# Patient Record
Sex: FEMALE | Race: WHITE | NOT HISPANIC OR LATINO
[De-identification: names, ages, dates, MRNs, and addresses within clinical notes are randomized per-mention and may not be internally consistent; named-entity substitution may affect disease eponyms.]

---

## 2017-06-23 ENCOUNTER — HOSPITAL (OUTPATIENT)
Dept: OTHER | Age: 27
End: 2017-06-23
Attending: NURSE PRACTITIONER

## 2018-02-04 ENCOUNTER — HOSPITAL (OUTPATIENT)
Dept: OTHER | Age: 28
End: 2018-02-04
Attending: PHYSICIAN ASSISTANT

## 2018-02-04 LAB
CHLAMYDIA PROBE: NEGATIVE
CHLAMYDIA/GC SOURCE: NORMAL
CLUE CELLS: NORMAL
CONTROL LINE: PRESENT
GC PROBE: NEGATIVE
Lab: CLEAR
N GON SOURCE: NORMAL
TRICHOMONAS: NORMAL
UA APPEAR: CLEAR
UA BACTERIA: ABNORMAL
UA BILI: NEGATIVE
UA BLOOD: ABNORMAL
UA COLOR: YELLOW
UA EPITHELIAL: ABNORMAL
UA GLUCOSE: NEGATIVE
UA KETONES: NEGATIVE
UA LEUK EST: ABNORMAL
UA NITRITE: NEGATIVE
UA PH: 6.5 (ref 5–7)
UA PROTEIN: ABNORMAL
UA RBC: ABNORMAL
UA SPEC GRAV: 1.02 (ref 1.01–1.02)
UA UROBILINOGEN: 0.2 MG/DL (ref 0.2–1)
UA WBC: ABNORMAL
UA YEAST: ABNORMAL
URINE PREG POC: NEGATIVE
WBC, WET PREP: NORMAL
YEAST, WET PREP: NORMAL

## 2018-02-07 ENCOUNTER — HOSPITAL (OUTPATIENT)
Dept: OTHER | Age: 28
End: 2018-02-07
Attending: NURSE PRACTITIONER

## 2018-02-07 LAB
A/G RATIO_: 1
ABS LYMPH: 1.2 K/CUMM (ref 1–3.5)
ABS MONO: 0.8 K/CUMM (ref 0.1–0.8)
ABS NEUTRO: 4.4 K/CUMM (ref 2–8)
ALBUMIN: 3.6 G/DL (ref 3.5–5)
ALK PHOS: 78 UNIT/L (ref 50–124)
ALT/GPT: 17 UNIT/L (ref 0–55)
ANION GAP SERPL CALC-SCNC: 9 MEQ/L (ref 10–20)
AST/GOT: 17 UNIT/L (ref 5–34)
BASOPHIL: 0 % (ref 0–1)
BILI TOTAL: 0.3 MG/DL (ref 0.2–1)
BUN SERPL-MCNC: 7 MG/DL (ref 6–20)
CALCIUM: 9.6 MG/DL (ref 8.4–10.2)
CHLORIDE: 105 MEQ/L (ref 97–107)
CONTROL LINE: PRESENT
CREATININE: 0.65 MG/DL (ref 0.6–1.3)
DIFF_TYPE?: ABNORMAL
EOSINOPHIL: 1 % (ref 0–6)
GLOBULIN_: 3.7 G/DL (ref 2–4.1)
GLUCOSE LVL: 88 MG/DL (ref 70–99)
HCT VFR BLD CALC: 40 % (ref 33–45)
HEMOLYSIS 2+: NEGATIVE
HGB BLD-MCNC: 13.5 G/DL (ref 11–15)
IMMATURE GRAN: 0.5 % (ref 0–0.3)
INSTR WBC: 6.5 K/CUMM (ref 4–11)
LIPEMIC 3+: NEGATIVE
LYMPHOCYTE: 19 %
Lab: CLEAR
MCH RBC QN AUTO: 33 PG (ref 25–35)
MCHC RBC AUTO-ENTMCNC: 33 G/DL (ref 32–37)
MCV RBC AUTO: 99 FL (ref 78–97)
MONOCYTE: 12 %
NEUTROPHIL: 68 %
NRBC BLD MANUAL-RTO: 0 % (ref 0–0.2)
PLATELET: 283 K/CUMM (ref 150–450)
POTASSIUM: 4 MEQ/L (ref 3.5–5.1)
RBC # BLD: 4.08 M/CUMM (ref 3.7–5.2)
RDW: 12 % (ref 11.5–14.5)
SODIUM: 138 MEQ/L (ref 136–145)
TCO2: 28 MEQ/L (ref 19–29)
TOTAL PROTEIN: 7.3 G/DL (ref 6.4–8.3)
UA APPEAR: CLEAR
UA BILI: NEGATIVE
UA BLOOD: NEGATIVE
UA COLOR: YELLOW
UA GLUCOSE: NEGATIVE
UA KETONES: NEGATIVE
UA LEUK EST: NEGATIVE
UA NITRITE: NEGATIVE
UA PH: 7.5 (ref 5–7)
UA PROTEIN: NEGATIVE
UA SPEC GRAV: 1.01 (ref 1.01–1.02)
UA UROBILINOGEN: 0.2 MG/DL (ref 0.2–1)
URINE PREG POC: NEGATIVE
WBC # BLD: 6.5 K/CUMM (ref 4–11)

## 2018-10-20 ENCOUNTER — HOSPITAL (OUTPATIENT)
Dept: OTHER | Age: 28
End: 2018-10-20
Attending: PHYSICIAN ASSISTANT

## 2018-10-20 LAB
INTERNAL QC: NORMAL
RAPID STREP A: NEGATIVE

## 2019-03-24 ENCOUNTER — OCCUPATIONAL HEALTH (OUTPATIENT)
Dept: URGENT CARE | Facility: CLINIC | Age: 29
End: 2019-03-24
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
## 2019-03-24 LAB
CTP QC/QA: YES
POC 5 PANEL DRUG SCREEN: NEGATIVE

## 2019-03-24 PROCEDURE — 80305 DRUG TEST PRSMV DIR OPT OBS: CPT | Mod: QW,S$GLB,, | Performed by: FAMILY MEDICINE

## 2019-03-24 PROCEDURE — 80305 POCT RAPID DRUG SCREEN 5 PANEL: ICD-10-PCS | Mod: QW,S$GLB,, | Performed by: FAMILY MEDICINE

## 2019-07-19 ENCOUNTER — PATIENT OUTREACH (OUTPATIENT)
Dept: ADMINISTRATIVE | Facility: HOSPITAL | Age: 29
End: 2019-07-19

## 2019-07-19 NOTE — PROGRESS NOTES
Health Maintenance Due   Topic Date Due    Lipid Panel  1992    Pneumococcal Vaccine (Medium Risk) (1 of 1 - PPSV23) 04/06/2011    Pap Smear  04/06/2013     Chart review completed 07/19/2019  Pre-visit outreach via mail

## 2019-07-19 NOTE — LETTER
July 19, 2019    Carolina Mena  [address] [facility] [address] [facility] [address]             [facility]  [address]  [facility] [address]  Phone: [phone] Dear Ms. Mena:    [facility] is committed to your overall health.  To help you get the most out of each of your visits, we will review your information to make sure you are up to date on all of your recommended tests and/or procedures.      Dr. Bustamante    has found that your chart shows you may be due for the following:    Fasting cholesterol labs (Lipid Panel)  Pneumonia Immunization  Pap smear    If you have had any of the above done at another facility, please bring the records with you or Fax them to [phone] so that your record at [facility] will be complete. If you have not had any of these tests or procedures done recently and would like to complete this testing ,  please call [phone] or send a message through your [facility] portal to your provider's office.     If you have an upcoming scheduled appointment for the above test and/or procedures, please disregard this letter.    If you are currently taking medication, please bring it with you to your appointment for review.      Sincerely,    Sybil Rivers, Care Coordinator  [facility]  Phone: [phone]  Fax: [phone]

## 2019-07-25 ENCOUNTER — OFFICE VISIT (OUTPATIENT)
Dept: FAMILY MEDICINE | Facility: CLINIC | Age: 29
End: 2019-07-25
Payer: COMMERCIAL

## 2019-07-25 VITALS
BODY MASS INDEX: 37.37 KG/M2 | OXYGEN SATURATION: 99 % | HEART RATE: 101 BPM | HEIGHT: 62 IN | WEIGHT: 203.06 LBS | DIASTOLIC BLOOD PRESSURE: 80 MMHG | SYSTOLIC BLOOD PRESSURE: 110 MMHG

## 2019-07-25 DIAGNOSIS — F43.22 ADJUSTMENT REACTION WITH ANXIETY: ICD-10-CM

## 2019-07-25 DIAGNOSIS — F41.0 PANIC ATTACKS: Primary | ICD-10-CM

## 2019-07-25 PROCEDURE — 3008F BODY MASS INDEX DOCD: CPT | Mod: CPTII,S$GLB,, | Performed by: INTERNAL MEDICINE

## 2019-07-25 PROCEDURE — 99999 PR PBB SHADOW E&M-EST. PATIENT-LVL III: CPT | Mod: PBBFAC,,, | Performed by: INTERNAL MEDICINE

## 2019-07-25 PROCEDURE — 99999 PR PBB SHADOW E&M-EST. PATIENT-LVL III: ICD-10-PCS | Mod: PBBFAC,,, | Performed by: INTERNAL MEDICINE

## 2019-07-25 PROCEDURE — 3008F PR BODY MASS INDEX (BMI) DOCUMENTED: ICD-10-PCS | Mod: CPTII,S$GLB,, | Performed by: INTERNAL MEDICINE

## 2019-07-25 PROCEDURE — 99203 PR OFFICE/OUTPT VISIT, NEW, LEVL III, 30-44 MIN: ICD-10-PCS | Mod: S$GLB,,, | Performed by: INTERNAL MEDICINE

## 2019-07-25 PROCEDURE — 99203 OFFICE O/P NEW LOW 30 MIN: CPT | Mod: S$GLB,,, | Performed by: INTERNAL MEDICINE

## 2019-07-25 RX ORDER — BUSPIRONE HYDROCHLORIDE 5 MG/1
5 TABLET ORAL
Qty: 60 TABLET | Refills: 0 | Status: SHIPPED | OUTPATIENT
Start: 2019-07-25 | End: 2019-09-30 | Stop reason: SDUPTHER

## 2019-07-25 NOTE — PROGRESS NOTES
"  Subjective     Carolina Mena is a 29 y.o. old, female here for Establish Care and Panic Attack    Patient is here to establish care and discuss panic attacks.  She has struggled with anxiety since at least age 19. She has never had any significant issues with depression. She has never been on medications. She was having on average one panic attack a month which has worsened significantly in the past few months. These seemed to worsen after two of her mother's friends passed away. She is having on average one panic attack a day that feels much more uncontrollable. They generally occur at work when she feels overwhelmed. They are associated with chest pain, sense of dread, anxiety about her health, SOB and other symptoms. She has tried distracting herself from the situation, walking, and using a stress ball. She tried an OTC medication but did not like the way it made her feel. She is having difficulty falling asleep at night due to her "mind racing". She has an appointment to see a therapist today.  She is in a supportive relationship, feels safe, got engaged in the past several months.    Review of Systems   Constitutional: Negative for chills and fever.   HENT: Negative for sinus pain.         Recent ear surgery   Eyes: Negative for blurred vision and pain.   Respiratory: Negative for cough and shortness of breath.    Cardiovascular: Negative for chest pain and palpitations.   Gastrointestinal: Negative for abdominal pain and nausea.   Genitourinary: Negative for dysuria and frequency.   Musculoskeletal: Negative for myalgias.        Residual L ankle pain after sprained ankle several months ago   Skin: Negative for itching and rash.   Neurological: Negative for weakness and headaches.   Endo/Heme/Allergies: Negative for environmental allergies. Does not bruise/bleed easily.   Psychiatric/Behavioral: Negative for depression. The patient does not have insomnia.      No past medical history on file.  Past Surgical " History:   Procedure Laterality Date    EXTERNAL EAR SURGERY       Review of patient's allergies indicates:   Allergen Reactions    Augmentin [amoxicillin-pot clavulanate] Hives    Ceclor [cefaclor] Hives    Polysporin [bacitracin-polymyxin b] Other (See Comments)     Burning sensation     Outpatient Medications Marked as Taking for the 7/25/19 encounter (Office Visit) with Gallito Bustamante MD   Medication Sig Dispense Refill    norethindrone-ethinyl estradiol (JUNEL FE 1/20) 1 mg-20 mcg (21)/75 mg (7) per tablet Take 1 tablet by mouth once daily.       Social History     Socioeconomic History    Marital status: Single     Spouse name: Not on file    Number of children: Not on file    Years of education: Not on file    Highest education level: Not on file   Occupational History    Not on file   Social Needs    Financial resource strain: Not hard at all    Food insecurity:     Worry: Never true     Inability: Never true    Transportation needs:     Medical: No     Non-medical: No   Tobacco Use    Smoking status: Current Every Day Smoker     Types: Vaping with nicotine    Smokeless tobacco: Never Used   Substance and Sexual Activity    Alcohol use: No     Frequency: Monthly or less     Drinks per session: 1 or 2     Binge frequency: Never    Drug use: No    Sexual activity: Yes   Lifestyle    Physical activity:     Days per week: 0 days     Minutes per session: 0 min    Stress: Very much   Relationships    Social connections:     Talks on phone: Three times a week     Gets together: Once a week     Attends Worship service: Not on file     Active member of club or organization: No     Attends meetings of clubs or organizations: Never     Relationship status: Living with partner   Other Topics Concern    Not on file   Social History Narrative    Not on file     Family History   Problem Relation Age of Onset    Drug abuse Mother     Hypertension Paternal Grandmother     Heart disease  "Paternal Grandfather     Diabetes Paternal Grandfather        Objective     /80 (BP Location: Left arm, Patient Position: Sitting, BP Method: Large (Manual))   Pulse 101   Ht 5' 2" (1.575 m)   Wt 92.1 kg (203 lb 0.7 oz)   SpO2 99%   BMI 37.14 kg/m²   Physical Exam   Constitutional: She is oriented to person, place, and time. She appears well-developed. No distress.   HENT:   Head: Normocephalic and atraumatic.   Mouth/Throat: Oropharynx is clear and moist and mucous membranes are normal.   Eyes: Pupils are equal, round, and reactive to light. Conjunctivae are normal.   Neck: Neck supple. No thyroid mass and no thyromegaly present.   Cardiovascular: Normal rate, regular rhythm and normal heart sounds.   No murmur heard.  Pulmonary/Chest: Breath sounds normal. No respiratory distress.   Abdominal: Soft. Normal appearance and bowel sounds are normal. There is no tenderness.   Musculoskeletal: She exhibits no edema or deformity.   Lymphadenopathy:     She has no cervical adenopathy.        Right: No supraclavicular adenopathy present.        Left: No supraclavicular adenopathy present.   Neurological: She is alert and oriented to person, place, and time.   Skin: Skin is warm and dry. No rash noted.   Psychiatric: Her behavior is normal. Her mood appears anxious.     Assessment and Plan     1. Panic attacks  Start 1-2 buspar prn for panic attacks. Start talking with a therapist. Exercise regularly.  - busPIRone (BUSPAR) 5 MG Tab; Take 1 tablet (5 mg total) by mouth as needed.  Dispense: 60 tablet; Refill: 0    2. Adjustment reaction with anxiety  Consider first line treatment with SSRI/SNRI, discussed possible medication options in the future.  Consider trazodone for a short time to help with insomnia.  Encouraged to call back or communicate via the portal.        ___________________  Gallito Bustamante MD  Internal Medicine and Pediatrics  "

## 2019-07-31 ENCOUNTER — HOSPITAL (OUTPATIENT)
Dept: OTHER | Age: 29
End: 2019-07-31
Attending: PHYSICIAN ASSISTANT

## 2019-09-27 ENCOUNTER — PATIENT MESSAGE (OUTPATIENT)
Dept: FAMILY MEDICINE | Facility: CLINIC | Age: 29
End: 2019-09-27

## 2019-09-30 DIAGNOSIS — F41.0 PANIC ATTACKS: ICD-10-CM

## 2019-09-30 RX ORDER — BUSPIRONE HYDROCHLORIDE 5 MG/1
5 TABLET ORAL
Qty: 60 TABLET | Refills: 0 | Status: SHIPPED | OUTPATIENT
Start: 2019-09-30 | End: 2019-11-24 | Stop reason: SDUPTHER

## 2019-11-19 ENCOUNTER — HOSPITAL (OUTPATIENT)
Dept: OTHER | Age: 29
End: 2019-11-19
Attending: NURSE PRACTITIONER

## 2019-11-21 ENCOUNTER — OFFICE VISIT (OUTPATIENT)
Dept: FAMILY MEDICINE | Facility: CLINIC | Age: 29
End: 2019-11-21
Payer: COMMERCIAL

## 2019-11-21 VITALS
DIASTOLIC BLOOD PRESSURE: 72 MMHG | WEIGHT: 214.5 LBS | HEIGHT: 62 IN | HEART RATE: 116 BPM | SYSTOLIC BLOOD PRESSURE: 116 MMHG | OXYGEN SATURATION: 98 % | BODY MASS INDEX: 39.47 KG/M2

## 2019-11-21 DIAGNOSIS — F41.9 ANXIETY DISORDER, UNSPECIFIED TYPE: Primary | ICD-10-CM

## 2019-11-21 PROCEDURE — 99999 PR PBB SHADOW E&M-EST. PATIENT-LVL III: ICD-10-PCS | Mod: PBBFAC,,, | Performed by: INTERNAL MEDICINE

## 2019-11-21 PROCEDURE — 99214 PR OFFICE/OUTPT VISIT, EST, LEVL IV, 30-39 MIN: ICD-10-PCS | Mod: S$GLB,,, | Performed by: INTERNAL MEDICINE

## 2019-11-21 PROCEDURE — 99214 OFFICE O/P EST MOD 30 MIN: CPT | Mod: S$GLB,,, | Performed by: INTERNAL MEDICINE

## 2019-11-21 PROCEDURE — 3008F PR BODY MASS INDEX (BMI) DOCUMENTED: ICD-10-PCS | Mod: CPTII,S$GLB,, | Performed by: INTERNAL MEDICINE

## 2019-11-21 PROCEDURE — 3008F BODY MASS INDEX DOCD: CPT | Mod: CPTII,S$GLB,, | Performed by: INTERNAL MEDICINE

## 2019-11-21 PROCEDURE — 99999 PR PBB SHADOW E&M-EST. PATIENT-LVL III: CPT | Mod: PBBFAC,,, | Performed by: INTERNAL MEDICINE

## 2019-11-21 RX ORDER — BUPROPION HYDROCHLORIDE 150 MG/1
150 TABLET ORAL DAILY
Qty: 30 TABLET | Refills: 1 | Status: SHIPPED | OUTPATIENT
Start: 2019-11-21 | End: 2019-12-11

## 2019-11-21 NOTE — PROGRESS NOTES
"  Subjective     Carolina Mena is a 29 y.o. old, female here for discuss medication    Patient is here for follow-up on anxiety. She started taking buspar after our first visit. She does sometimes find it very helpful. She won't necessarily take it everyday. She is open to the idea now of starting a daily medication. Her mother has been on prozac. She has never been on a daily SSRI/SNRI. She feels some depression or aggravation at herself for not being able to handle her anxiety and panic attacks. Current stressors include her job, preparing for her wedding, and her palpitations. She was able to quit vaping. She also cut out caffeine.    Review of Systems   HENT: Negative for hearing loss.    Eyes: Negative for discharge.   Respiratory: Negative for wheezing.    Cardiovascular: Positive for palpitations. Negative for chest pain.   Gastrointestinal: Negative for blood in stool, constipation, diarrhea and vomiting.   Genitourinary: Negative for dysuria and hematuria.   Musculoskeletal: Negative for neck pain.   Neurological: Negative for weakness and headaches.   Endo/Heme/Allergies: Negative for polydipsia.   Answers for HPI/ROS submitted by the patient on 11/21/2019   activity change: No  unexpected weight change: No  rhinorrhea: No  trouble swallowing: No  visual disturbance: No  chest tightness: No  polyuria: No  difficulty urinating: No  menstrual problem: No  joint swelling: No  arthralgias: No  confusion: No  dysphoric mood: Yes    Medications     Outpatient Medications Marked as Taking for the 11/21/19 encounter (Office Visit) with Gallito Bustamante MD   Medication Sig Dispense Refill    busPIRone (BUSPAR) 5 MG Tab Take 1 tablet (5 mg total) by mouth as needed. 60 tablet 0    norethindrone-ethinyl estradiol (JUNEL FE 1/20) 1 mg-20 mcg (21)/75 mg (7) per tablet Take 1 tablet by mouth once daily.       Objective     /72   Pulse (!) 116   Ht 5' 2" (1.575 m)   Wt 97.3 kg (214 lb 8.1 oz)   SpO2 98%  "  BMI 39.23 kg/m²   Physical Exam   Constitutional: She appears well-developed. No distress.   Psychiatric: Her speech is normal and behavior is normal. Thought content normal. Her mood appears anxious. Cognition and memory are normal. She does not exhibit a depressed mood.     Assessment and Plan     1. Anxiety disorder, unspecified type  Continue counseling.  Encourage regular physical activity, social engagement, management of stress.  Reassurance regarding palpitations.  Excellent job stopping vaping.  Start low dose wellbutrin. Consider zoloft or prozac if not helpful.  - buPROPion (WELLBUTRIN XL) 150 MG TB24 tablet; Take 1 tablet (150 mg total) by mouth once daily.  Dispense: 30 tablet; Refill: 1    25 minutes spent with patient, greater than half was spent on counseling or coordination of care.    ___________________  Gallito Bustamante MD  Internal Medicine and Pediatrics

## 2019-11-22 LAB — PAP SMEAR: NORMAL

## 2019-11-24 DIAGNOSIS — F41.0 PANIC ATTACKS: ICD-10-CM

## 2019-11-24 RX ORDER — BUSPIRONE HYDROCHLORIDE 5 MG/1
TABLET ORAL
Qty: 30 TABLET | Refills: 1 | Status: SHIPPED | OUTPATIENT
Start: 2019-11-24 | End: 2019-12-18 | Stop reason: SDUPTHER

## 2019-11-25 ENCOUNTER — PATIENT MESSAGE (OUTPATIENT)
Dept: FAMILY MEDICINE | Facility: CLINIC | Age: 29
End: 2019-11-25

## 2019-12-04 ENCOUNTER — PATIENT MESSAGE (OUTPATIENT)
Dept: FAMILY MEDICINE | Facility: CLINIC | Age: 29
End: 2019-12-04

## 2019-12-11 ENCOUNTER — PATIENT MESSAGE (OUTPATIENT)
Dept: FAMILY MEDICINE | Facility: CLINIC | Age: 29
End: 2019-12-11

## 2019-12-18 DIAGNOSIS — F41.0 PANIC ATTACKS: ICD-10-CM

## 2019-12-18 DIAGNOSIS — F41.9 ANXIETY DISORDER, UNSPECIFIED TYPE: ICD-10-CM

## 2019-12-18 RX ORDER — BUSPIRONE HYDROCHLORIDE 5 MG/1
TABLET ORAL
Qty: 30 TABLET | Refills: 1 | Status: SHIPPED | OUTPATIENT
Start: 2019-12-18 | End: 2020-02-17

## 2019-12-18 RX ORDER — BUPROPION HYDROCHLORIDE 150 MG/1
TABLET ORAL
Qty: 30 TABLET | Refills: 1 | Status: SHIPPED | OUTPATIENT
Start: 2019-12-18 | End: 2020-01-13 | Stop reason: SDUPTHER

## 2020-01-10 LAB
CHOLEST SERPL-MSCNC: 222 MG/DL (ref 0–200)
HDLC SERPL-MCNC: 43 MG/DL
LDLC SERPL CALC-MCNC: 142 MG/DL (ref 0–160)
TRIGL SERPL-MCNC: 186 MG/DL (ref 40–160)

## 2020-02-07 DIAGNOSIS — F41.9 ANXIETY DISORDER, UNSPECIFIED TYPE: ICD-10-CM

## 2020-02-07 RX ORDER — BUPROPION HYDROCHLORIDE 150 MG/1
150 TABLET ORAL DAILY
Qty: 90 TABLET | Refills: 0 | Status: SHIPPED | OUTPATIENT
Start: 2020-02-07 | End: 2021-07-16

## 2020-02-17 DIAGNOSIS — F41.0 PANIC ATTACKS: ICD-10-CM

## 2020-02-17 RX ORDER — BUSPIRONE HYDROCHLORIDE 5 MG/1
TABLET ORAL
Qty: 30 TABLET | Refills: 1 | Status: SHIPPED | OUTPATIENT
Start: 2020-02-17 | End: 2020-03-06 | Stop reason: SDUPTHER

## 2020-03-06 DIAGNOSIS — F41.0 PANIC ATTACKS: ICD-10-CM

## 2020-03-06 RX ORDER — BUSPIRONE HYDROCHLORIDE 5 MG/1
5 TABLET ORAL DAILY PRN
Qty: 90 TABLET | Refills: 1 | Status: SHIPPED | OUTPATIENT
Start: 2020-03-06 | End: 2020-09-20

## 2020-03-18 ENCOUNTER — PATIENT MESSAGE (OUTPATIENT)
Dept: FAMILY MEDICINE | Facility: CLINIC | Age: 30
End: 2020-03-18

## 2020-03-18 RX ORDER — VALACYCLOVIR HYDROCHLORIDE 1 G/1
2000 TABLET, FILM COATED ORAL EVERY 12 HOURS
Qty: 4 TABLET | Refills: 2 | Status: SHIPPED | OUTPATIENT
Start: 2020-03-18 | End: 2021-07-16

## 2020-03-18 NOTE — TELEPHONE ENCOUNTER
Please see [employer] message. Does she need a video visit with you or can you send in something for her fever blister? Please advise

## 2020-03-19 ENCOUNTER — PATIENT MESSAGE (OUTPATIENT)
Dept: FAMILY MEDICINE | Facility: CLINIC | Age: 30
End: 2020-03-19

## 2020-04-21 ENCOUNTER — PATIENT OUTREACH (OUTPATIENT)
Dept: ADMINISTRATIVE | Facility: HOSPITAL | Age: 30
End: 2020-04-21

## 2020-06-12 ENCOUNTER — PATIENT MESSAGE (OUTPATIENT)
Dept: FAMILY MEDICINE | Facility: CLINIC | Age: 30
End: 2020-06-12

## 2020-06-15 ENCOUNTER — OFFICE VISIT (OUTPATIENT)
Dept: FAMILY MEDICINE | Facility: CLINIC | Age: 30
End: 2020-06-15
Payer: COMMERCIAL

## 2020-06-15 DIAGNOSIS — M54.59 MECHANICAL LOW BACK PAIN: Primary | ICD-10-CM

## 2020-06-15 PROCEDURE — 99213 OFFICE O/P EST LOW 20 MIN: CPT | Mod: 95,,, | Performed by: INTERNAL MEDICINE

## 2020-06-15 PROCEDURE — 99213 PR OFFICE/OUTPT VISIT, EST, LEVL III, 20-29 MIN: ICD-10-PCS | Mod: 95,,, | Performed by: INTERNAL MEDICINE

## 2020-06-15 RX ORDER — CYCLOBENZAPRINE HCL 10 MG
10 TABLET ORAL NIGHTLY
Qty: 30 TABLET | Refills: 0 | Status: SHIPPED | OUTPATIENT
Start: 2020-06-15 | End: 2020-07-20

## 2020-06-15 NOTE — PROGRESS NOTES
Subjective     Carolina Mena is a 30 y.o. old, female here for Back pain.    Telemedicine visit  Patient's Location: home  Visit Type: Virtual Visit with synchronous audio and video  Total Time Spent with Patient: 12 mins  Each patient to whom he or she provides medical services by telemedicine is:  (1) informed of the relationship between the physician and patient and the respective role of any other health care provider with respect to management of the patient; and (2) notified that he or she may decline to receive medical services by telemedicine and may withdraw from such care at any time.    Answers for HPI/ROS submitted by the patient on 6/13/2020   Back pain  Chronicity: new  Onset: in the past 7 days  Frequency: daily  Progression since onset: waxing and waning  Pain location: thoracic spine  Pain quality: aching  Radiates to: does not radiate  Pain - numeric: 6/10  Pain is: the same all the time  Aggravated by: bending, position, lying down, sitting  Stiffness is present: all day  bladder incontinence: No  bowel incontinence: No  leg pain: No  numbness: No  paresis: No  paresthesias: No  pelvic pain: No  perianal numbness: No  genital pain: No  Pain severity: moderate  Treatments tried: NSAIDs, heat  Improvement on treatment: mild    Review of Systems   Constitutional: Negative for fever and weight loss.   Cardiovascular: Negative for chest pain.   Gastrointestinal: Negative for abdominal pain.   Genitourinary: Negative for dysuria and hematuria.   Musculoskeletal: Positive for back pain.   Neurological: Negative for tingling, weakness and headaches.     Medications     No outpatient medications have been marked as taking for the 6/15/20 encounter (Office Visit) with Gallito Bustamante MD.     Objective     There were no vitals taken for this visit.  Physical Exam   Constitutional: She appears well-developed. No distress.   Neurological: She is alert.   Psychiatric: She has a normal mood and affect.      Assessment and Plan     1. Mechanical low back pain  Mechanical low back pain. No red flags. No indications for imaging.  Education done  Will treat supportively with NSAID's, flexeril to be used mainly at night, stretching and walking.    - cyclobenzaprine (FLEXERIL) 10 MG tablet; Take 1 tablet (10 mg total) by mouth every evening.  Dispense: 30 tablet; Refill: 0    ___________________  Gallito Bustamante MD  Internal Medicine and Pediatrics

## 2020-11-24 LAB
PAP RECOMMENDATION EXT: NORMAL
PAP SMEAR: NORMAL

## 2021-07-07 ENCOUNTER — PATIENT MESSAGE (OUTPATIENT)
Dept: ADMINISTRATIVE | Facility: HOSPITAL | Age: 31
End: 2021-07-07

## 2021-07-16 ENCOUNTER — OFFICE VISIT (OUTPATIENT)
Dept: FAMILY MEDICINE | Facility: CLINIC | Age: 31
End: 2021-07-16
Payer: COMMERCIAL

## 2021-07-16 ENCOUNTER — LAB VISIT (OUTPATIENT)
Dept: LAB | Facility: HOSPITAL | Age: 31
End: 2021-07-16
Attending: INTERNAL MEDICINE
Payer: COMMERCIAL

## 2021-07-16 VITALS
WEIGHT: 224.88 LBS | TEMPERATURE: 99 F | BODY MASS INDEX: 41.38 KG/M2 | OXYGEN SATURATION: 97 % | SYSTOLIC BLOOD PRESSURE: 132 MMHG | HEIGHT: 62 IN | HEART RATE: 97 BPM | DIASTOLIC BLOOD PRESSURE: 88 MMHG

## 2021-07-16 DIAGNOSIS — Z00.00 PHYSICAL EXAM, ROUTINE: ICD-10-CM

## 2021-07-16 DIAGNOSIS — F41.0 PANIC ATTACKS: ICD-10-CM

## 2021-07-16 DIAGNOSIS — F41.9 ANXIETY DISORDER, UNSPECIFIED TYPE: ICD-10-CM

## 2021-07-16 DIAGNOSIS — Z00.00 PHYSICAL EXAM, ROUTINE: Primary | ICD-10-CM

## 2021-07-16 LAB
ERYTHROCYTE [DISTWIDTH] IN BLOOD BY AUTOMATED COUNT: 13.3 % (ref 11.5–14.5)
HCT VFR BLD AUTO: 39.9 % (ref 37–48.5)
HGB BLD-MCNC: 12.4 G/DL (ref 12–16)
MCH RBC QN AUTO: 25.6 PG (ref 27–31)
MCHC RBC AUTO-ENTMCNC: 31.1 G/DL (ref 32–36)
MCV RBC AUTO: 82 FL (ref 82–98)
PLATELET # BLD AUTO: 324 K/UL (ref 150–450)
PMV BLD AUTO: 9.7 FL (ref 9.2–12.9)
RBC # BLD AUTO: 4.84 M/UL (ref 4–5.4)
WBC # BLD AUTO: 10.68 K/UL (ref 3.9–12.7)

## 2021-07-16 PROCEDURE — 99999 PR PBB SHADOW E&M-EST. PATIENT-LVL III: ICD-10-PCS | Mod: PBBFAC,,, | Performed by: INTERNAL MEDICINE

## 2021-07-16 PROCEDURE — 3008F BODY MASS INDEX DOCD: CPT | Mod: CPTII,S$GLB,, | Performed by: INTERNAL MEDICINE

## 2021-07-16 PROCEDURE — 99395 PREV VISIT EST AGE 18-39: CPT | Mod: S$GLB,,, | Performed by: INTERNAL MEDICINE

## 2021-07-16 PROCEDURE — 36415 COLL VENOUS BLD VENIPUNCTURE: CPT | Mod: PO | Performed by: INTERNAL MEDICINE

## 2021-07-16 PROCEDURE — 1125F AMNT PAIN NOTED PAIN PRSNT: CPT | Mod: S$GLB,,, | Performed by: INTERNAL MEDICINE

## 2021-07-16 PROCEDURE — 80061 LIPID PANEL: CPT | Performed by: INTERNAL MEDICINE

## 2021-07-16 PROCEDURE — 99999 PR PBB SHADOW E&M-EST. PATIENT-LVL III: CPT | Mod: PBBFAC,,, | Performed by: INTERNAL MEDICINE

## 2021-07-16 PROCEDURE — 1125F PR PAIN SEVERITY QUANTIFIED, PAIN PRESENT: ICD-10-PCS | Mod: S$GLB,,, | Performed by: INTERNAL MEDICINE

## 2021-07-16 PROCEDURE — 83036 HEMOGLOBIN GLYCOSYLATED A1C: CPT | Performed by: INTERNAL MEDICINE

## 2021-07-16 PROCEDURE — 85027 COMPLETE CBC AUTOMATED: CPT | Performed by: INTERNAL MEDICINE

## 2021-07-16 PROCEDURE — 3008F PR BODY MASS INDEX (BMI) DOCUMENTED: ICD-10-PCS | Mod: CPTII,S$GLB,, | Performed by: INTERNAL MEDICINE

## 2021-07-16 PROCEDURE — 99395 PR PREVENTIVE VISIT,EST,18-39: ICD-10-PCS | Mod: S$GLB,,, | Performed by: INTERNAL MEDICINE

## 2021-07-16 RX ORDER — BUSPIRONE HYDROCHLORIDE 5 MG/1
5 TABLET ORAL DAILY PRN
Qty: 90 TABLET | Refills: 1 | Status: SHIPPED | OUTPATIENT
Start: 2021-07-16 | End: 2021-12-20

## 2021-07-17 ENCOUNTER — PATIENT MESSAGE (OUTPATIENT)
Dept: FAMILY MEDICINE | Facility: CLINIC | Age: 31
End: 2021-07-17

## 2021-07-17 LAB
CHOLEST SERPL-MCNC: 201 MG/DL (ref 120–199)
CHOLEST/HDLC SERPL: 5 {RATIO} (ref 2–5)
ESTIMATED AVG GLUCOSE: 103 MG/DL (ref 68–131)
HBA1C MFR BLD: 5.2 % (ref 4–5.6)
HDLC SERPL-MCNC: 40 MG/DL (ref 40–75)
HDLC SERPL: 19.9 % (ref 20–50)
LDLC SERPL CALC-MCNC: 116.4 MG/DL (ref 63–159)
NONHDLC SERPL-MCNC: 161 MG/DL
TRIGL SERPL-MCNC: 223 MG/DL (ref 30–150)

## 2021-12-20 DIAGNOSIS — F41.0 PANIC ATTACKS: ICD-10-CM

## 2021-12-20 RX ORDER — BUSPIRONE HYDROCHLORIDE 5 MG/1
5 TABLET ORAL DAILY PRN
Qty: 90 TABLET | Refills: 1 | Status: SHIPPED | OUTPATIENT
Start: 2021-12-20 | End: 2022-06-20 | Stop reason: SDUPTHER

## 2022-10-13 ENCOUNTER — OFFICE VISIT (OUTPATIENT)
Dept: FAMILY MEDICINE | Facility: CLINIC | Age: 32
End: 2022-10-13
Payer: COMMERCIAL

## 2022-10-13 VITALS
BODY MASS INDEX: 44.1 KG/M2 | DIASTOLIC BLOOD PRESSURE: 78 MMHG | HEIGHT: 62 IN | HEART RATE: 86 BPM | OXYGEN SATURATION: 96 % | SYSTOLIC BLOOD PRESSURE: 126 MMHG | WEIGHT: 239.63 LBS

## 2022-10-13 DIAGNOSIS — F41.0 PANIC ATTACKS: ICD-10-CM

## 2022-10-13 DIAGNOSIS — G56.01 CARPAL TUNNEL SYNDROME ON RIGHT: ICD-10-CM

## 2022-10-13 DIAGNOSIS — F41.9 ANXIETY DISORDER, UNSPECIFIED TYPE: Primary | ICD-10-CM

## 2022-10-13 DIAGNOSIS — M54.59 MECHANICAL LOW BACK PAIN: ICD-10-CM

## 2022-10-13 PROCEDURE — 1159F PR MEDICATION LIST DOCUMENTED IN MEDICAL RECORD: ICD-10-PCS | Mod: CPTII,S$GLB,, | Performed by: INTERNAL MEDICINE

## 2022-10-13 PROCEDURE — 99999 PR PBB SHADOW E&M-EST. PATIENT-LVL III: ICD-10-PCS | Mod: PBBFAC,,, | Performed by: INTERNAL MEDICINE

## 2022-10-13 PROCEDURE — 3074F SYST BP LT 130 MM HG: CPT | Mod: CPTII,S$GLB,, | Performed by: INTERNAL MEDICINE

## 2022-10-13 PROCEDURE — 99214 PR OFFICE/OUTPT VISIT, EST, LEVL IV, 30-39 MIN: ICD-10-PCS | Mod: S$GLB,,, | Performed by: INTERNAL MEDICINE

## 2022-10-13 PROCEDURE — 1159F MED LIST DOCD IN RCRD: CPT | Mod: CPTII,S$GLB,, | Performed by: INTERNAL MEDICINE

## 2022-10-13 PROCEDURE — 3074F PR MOST RECENT SYSTOLIC BLOOD PRESSURE < 130 MM HG: ICD-10-PCS | Mod: CPTII,S$GLB,, | Performed by: INTERNAL MEDICINE

## 2022-10-13 PROCEDURE — 99214 OFFICE O/P EST MOD 30 MIN: CPT | Mod: S$GLB,,, | Performed by: INTERNAL MEDICINE

## 2022-10-13 PROCEDURE — 1160F PR REVIEW ALL MEDS BY PRESCRIBER/CLIN PHARMACIST DOCUMENTED: ICD-10-PCS | Mod: CPTII,S$GLB,, | Performed by: INTERNAL MEDICINE

## 2022-10-13 PROCEDURE — 3078F PR MOST RECENT DIASTOLIC BLOOD PRESSURE < 80 MM HG: ICD-10-PCS | Mod: CPTII,S$GLB,, | Performed by: INTERNAL MEDICINE

## 2022-10-13 PROCEDURE — 3078F DIAST BP <80 MM HG: CPT | Mod: CPTII,S$GLB,, | Performed by: INTERNAL MEDICINE

## 2022-10-13 PROCEDURE — 99999 PR PBB SHADOW E&M-EST. PATIENT-LVL III: CPT | Mod: PBBFAC,,, | Performed by: INTERNAL MEDICINE

## 2022-10-13 PROCEDURE — 1160F RVW MEDS BY RX/DR IN RCRD: CPT | Mod: CPTII,S$GLB,, | Performed by: INTERNAL MEDICINE

## 2022-10-13 RX ORDER — PROGESTERONE 200 MG/1
200 CAPSULE ORAL DAILY
COMMUNITY
Start: 2022-09-08 | End: 2023-02-13 | Stop reason: SDUPTHER

## 2022-10-13 RX ORDER — BUSPIRONE HYDROCHLORIDE 5 MG/1
TABLET ORAL
Qty: 90 TABLET | Refills: 1 | Status: SHIPPED | OUTPATIENT
Start: 2022-10-13 | End: 2023-02-13 | Stop reason: SDUPTHER

## 2022-10-13 RX ORDER — CYCLOBENZAPRINE HCL 10 MG
10 TABLET ORAL NIGHTLY
Qty: 30 TABLET | Refills: 0 | Status: SHIPPED | OUTPATIENT
Start: 2022-10-13 | End: 2022-11-17

## 2022-10-13 RX ORDER — METFORMIN HYDROCHLORIDE 500 MG/1
500 TABLET, EXTENDED RELEASE ORAL DAILY
COMMUNITY
Start: 2022-09-26 | End: 2022-10-13

## 2022-10-13 NOTE — PROGRESS NOTES
"  Subjective     Carolina Mena is a 32 y.o. old, female here for Annual Exam and Wrist Pain    33 y/o with PMH of anxiety, panic d/o.  Overall doing well from anxiety standpoint.  Stopped OCP's which helped.  Saw melany irwin'ed with possible PCOS, but no h/o previously irregular menses, nor insulin resistance, nor hirsutism, nor PCO's.  New problem with right wrist pain, numbness. She is working from home, at the computer a lot, also flexes wrist at night likely. A wrist brace has helped some.    Answers submitted by the patient for this visit:  Review of Systems Questionnaire (Submitted on 10/10/2022)  activity change: No  unexpected weight change: No  rhinorrhea: No  trouble swallowing: No  visual disturbance: No  chest tightness: No  polyuria: No  difficulty urinating: No  menstrual problem: No  joint swelling: No  arthralgias: No  confusion: No  dysphoric mood: No    Review of Systems   HENT:  Negative for hearing loss.    Eyes:  Negative for discharge.   Respiratory:  Negative for wheezing.    Cardiovascular:  Negative for chest pain and palpitations.   Gastrointestinal:  Negative for blood in stool, constipation, diarrhea and vomiting.   Genitourinary:  Negative for dysuria and hematuria.   Musculoskeletal:  Negative for neck pain.   Neurological:  Negative for weakness and headaches.   Endo/Heme/Allergies:  Negative for polydipsia.   Medications     No outpatient medications have been marked as taking for the 10/13/22 encounter (Office Visit) with Gallito Bustamante MD.     Objective     /78   Pulse 86   Ht 5' 2" (1.575 m)   Wt 108.7 kg (239 lb 10.2 oz)   SpO2 96%   BMI 43.83 kg/m²   Physical Exam  Constitutional:       General: She is not in acute distress.     Appearance: Normal appearance. She is well-developed.   Neurological:      Mental Status: She is alert.     Assessment and Plan     Anxiety disorder, unspecified type    Panic attacks  -     busPIRone (BUSPAR) 5 MG Tab; TAKE 1 TABLET BY " MOUTH DAILY AS NEEDED. Strength: 5 mg  Dispense: 90 tablet; Refill: 1    Mechanical low back pain  -     cyclobenzaprine (FLEXERIL) 10 MG tablet; Take 1 tablet (10 mg total) by mouth every evening.  Dispense: 30 tablet; Refill: 0    Carpal tunnel syndrome on right    Buspar prn  Flexeril prn  Wear wrist brace especially at night, be mindful of ergonomics and wrist compression.    Follow up in about 1 year (around 10/13/2023).  ___________________  Gallito Bustamante MD  Internal Medicine and Pediatrics

## 2023-01-05 LAB
HUMAN PAPILLOMAVIRUS (HPV): NORMAL
PAP RECOMMENDATION EXT: NORMAL
PAP SMEAR: NORMAL

## 2023-02-13 ENCOUNTER — PATIENT MESSAGE (OUTPATIENT)
Dept: FAMILY MEDICINE | Facility: CLINIC | Age: 33
End: 2023-02-13

## 2023-02-13 ENCOUNTER — OFFICE VISIT (OUTPATIENT)
Dept: FAMILY MEDICINE | Facility: CLINIC | Age: 33
End: 2023-02-13
Payer: COMMERCIAL

## 2023-02-13 DIAGNOSIS — E78.5 DYSLIPIDEMIA: Primary | ICD-10-CM

## 2023-02-13 DIAGNOSIS — M54.59 MECHANICAL LOW BACK PAIN: ICD-10-CM

## 2023-02-13 DIAGNOSIS — F41.0 PANIC ATTACKS: ICD-10-CM

## 2023-02-13 PROCEDURE — 1160F RVW MEDS BY RX/DR IN RCRD: CPT | Mod: CPTII,95,, | Performed by: INTERNAL MEDICINE

## 2023-02-13 PROCEDURE — 1159F MED LIST DOCD IN RCRD: CPT | Mod: CPTII,95,, | Performed by: INTERNAL MEDICINE

## 2023-02-13 PROCEDURE — 1160F PR REVIEW ALL MEDS BY PRESCRIBER/CLIN PHARMACIST DOCUMENTED: ICD-10-PCS | Mod: CPTII,95,, | Performed by: INTERNAL MEDICINE

## 2023-02-13 PROCEDURE — 99213 OFFICE O/P EST LOW 20 MIN: CPT | Mod: 95,,, | Performed by: INTERNAL MEDICINE

## 2023-02-13 PROCEDURE — 99213 PR OFFICE/OUTPT VISIT, EST, LEVL III, 20-29 MIN: ICD-10-PCS | Mod: 95,,, | Performed by: INTERNAL MEDICINE

## 2023-02-13 PROCEDURE — 1159F PR MEDICATION LIST DOCUMENTED IN MEDICAL RECORD: ICD-10-PCS | Mod: CPTII,95,, | Performed by: INTERNAL MEDICINE

## 2023-02-13 RX ORDER — BUSPIRONE HYDROCHLORIDE 5 MG/1
TABLET ORAL
Qty: 90 TABLET | Refills: 1 | Status: SHIPPED | OUTPATIENT
Start: 2023-02-13 | End: 2023-09-19

## 2023-02-13 RX ORDER — CYCLOBENZAPRINE HCL 10 MG
10 TABLET ORAL NIGHTLY
Qty: 30 TABLET | Refills: 2 | Status: SHIPPED | OUTPATIENT
Start: 2023-02-13 | End: 2023-10-03 | Stop reason: SDUPTHER

## 2023-02-13 RX ORDER — PROGESTERONE 200 MG/1
CAPSULE ORAL
Qty: 36 CAPSULE | Refills: 3 | Status: SHIPPED | OUTPATIENT
Start: 2023-02-13 | End: 2023-11-21

## 2023-02-13 NOTE — PROGRESS NOTES
Subjective     Carolina Mena is a 32 y.o. old, female here for lab results    Telemedicine visit  Patient's Location: home  Visit Type: Virtual Visit with synchronous audio and video  Each patient to whom he or she provides medical services by telemedicine is:  (1) informed of the relationship between the physician and patient and the respective role of any other health care provider with respect to management of the patient; and (2) notified that he or she may decline to receive medical services by telemedicine and may withdraw from such care at any time.    Recently saw obgyn, had labs through [place]: reviewed.  Elevated LDL and trigs, low HDL.  She started a MTV with fish oil.  Interested in diet, lifestyle mods  Answers submitted by the patient for this visit:  Review of Systems Questionnaire (Submitted on 2/6/2023)  activity change: No  unexpected weight change: No  rhinorrhea: No  trouble swallowing: No  visual disturbance: No  chest tightness: No  polyuria: No  difficulty urinating: No  menstrual problem: Yes  joint swelling: No  arthralgias: No  confusion: No  dysphoric mood: No    Review of Systems   HENT:  Negative for hearing loss.    Eyes:  Negative for discharge.   Respiratory:  Negative for wheezing.    Cardiovascular:  Negative for chest pain and palpitations.   Gastrointestinal:  Negative for blood in stool, constipation, diarrhea and vomiting.   Genitourinary:  Negative for dysuria and hematuria.   Musculoskeletal:  Negative for neck pain.   Neurological:  Negative for weakness and headaches.   Endo/Heme/Allergies:  Negative for polydipsia.   Medications     No outpatient medications have been marked as taking for the 2/13/23 encounter (Office Visit) with Gallito Bustamante MD.     Objective   Physical Exam  Constitutional:       General: She is not in acute distress.     Appearance: Normal appearance. She is well-developed.   Neurological:      Mental Status: She is alert.     Assessment  and Plan     Dyslipidemia    Panic attacks  -     busPIRone (BUSPAR) 5 MG Tab; TAKE 1 TABLET BY MOUTH DAILY AS NEEDED. Strength: 5 mg  Dispense: 90 tablet; Refill: 1    Mechanical low back pain  -     cyclobenzaprine (FLEXERIL) 10 MG tablet; Take 1 tablet (10 mg total) by mouth every evening.  Dispense: 30 tablet; Refill: 2    Discussed DASH/mediterranean diet, exercise. Monitor and consider statin therapy in the future.  No follow-ups on file.  ___________________  Gallito uBstamante MD  Internal Medicine and Pediatrics

## 2023-05-07 ENCOUNTER — PATIENT MESSAGE (OUTPATIENT)
Dept: FAMILY MEDICINE | Facility: CLINIC | Age: 33
End: 2023-05-07
Payer: COMMERCIAL

## 2023-09-11 ENCOUNTER — PATIENT MESSAGE (OUTPATIENT)
Dept: ADMINISTRATIVE | Facility: HOSPITAL | Age: 33
End: 2023-09-11
Payer: COMMERCIAL

## 2023-09-18 DIAGNOSIS — F41.0 PANIC ATTACKS: ICD-10-CM

## 2023-09-19 RX ORDER — BUSPIRONE HYDROCHLORIDE 5 MG/1
TABLET ORAL
Qty: 90 TABLET | Refills: 1 | Status: SHIPPED | OUTPATIENT
Start: 2023-09-19 | End: 2023-10-03 | Stop reason: SDUPTHER

## 2023-09-20 ENCOUNTER — PATIENT OUTREACH (OUTPATIENT)
Dept: ADMINISTRATIVE | Facility: HOSPITAL | Age: 33
End: 2023-09-20
Payer: COMMERCIAL

## 2023-09-20 ENCOUNTER — PATIENT MESSAGE (OUTPATIENT)
Dept: ADMINISTRATIVE | Facility: HOSPITAL | Age: 33
End: 2023-09-20
Payer: COMMERCIAL

## 2023-09-20 NOTE — PROGRESS NOTES
Population Health Chart Review & Patient Outreach Details:     Reason for Outreach Encounter:     [x]  Non-Compliant Report   []  Payor Report ([employer], PHN, BCBS, MSSP, MCIP, UHC, etc.)   []  Pre-Visit Chart Review     Updates Requested / Reviewed:     []  Care Everywhere    []     []  External Sources ([facility], [facility], DIS, etc.)   []  Care Team Updated    Patient Outreach Method:    [x]  Telephone Outreach Completed   [] Successful   [x] Left Voicemail   [] Unable to Contact (wrong number, no voicemail)  [x]  [facility] Portal Outreach Sent  []  Letter Outreach Mailed  []  Fax Sent for External Records  []  External Records Upload    Health Maintenance Topics Addressed and Outreach Outcomes / Actions Taken:        []      Breast Cancer Screening []  Mammo Scheduled      []  External Records Requested     []  Added Reminder to Complete to Upcoming Primary Care Appt Notes     []  Patient Declined     []  Patient Will Call Back to Schedule     []  Patient Will Schedule with External Provider / Order Routed if Applicable             [x]       Cervical Cancer Screening []  Pap Scheduled      []  External Records Requested     []  Added Reminder to Complete to Upcoming Primary Care Appt Notes     []  Patient Declined     []  Patient Will Call Back to Schedule     []  Patient Will Schedule with External Provider               []          Colorectal Cancer Screening []  Colonoscopy Case Request or Referral Placed     []  External Records Requested     []  Added Reminder to Complete to Upcoming Primary Care Appt Notes     []  Patient Declined     []  Patient Will Call Back to Schedule     []  Patient Will Schedule with External Provider     []  Fit Kit Mailed (add the SmartPhrase under additional notes)     []  Reminded Patient to Complete Home Test             []      Diabetic Eye Exam []  Eye Camera Scheduled or Optometry Referral Placed     []  External Records Requested     []  Added Reminder to Complete to  Upcoming Primary Care Appt Notes     []  Patient Declined     []  Patient Will Call Back to Schedule     []  Patient Will Schedule with External Provider             []      Blood Pressure Control []  Primary Care Follow Up Visit Scheduled     []  Remote Blood Pressure Reading Captured     []  Added Reminder to Complete to Upcoming Primary Care Appt Notes     []  Patient Declined     []  Patient Will Call Back / Patient Will Send Portal Message with Reading     []  Patient Will Call Back to Schedule Provider Visit             []       HbA1c & Other Labs []  Lab Appt Scheduled for Due Labs     []  Primary Care Follow Up Visit Scheduled      []  Reminded Patient to Complete Home Test     []  Added Reminder to Complete to Upcoming Primary Care Appt Notes     []  Patient Declined     []  Patient Will Call Back to Schedule     []  Patient Will Schedule with External Provider / Order Routed if Applicable           []    Schedule Primary Care Appt []  Primary Care Appt Scheduled     []  Patient Declined     []  Patient Will Call Back to Schedule     []  Pt Established with External Provider & Updated Care Team             []      Medication Adherence []  Primary Care Appointment Scheduled     []  Added Reminder to Upcoming Primary Care Appt Notes     []  Patient Reminded to  Prescription     []  Patient Declined, Provider Notified if Needed     []  Sent Provider Message to Review and/or Add Exclusion to Problem List             []      Osteoporosis Screening []  DXA Appointment Scheduled     []  External Records Requested     []  Added Reminder to Complete to Upcoming Primary Care Appt Notes     []  Patient Declined     []  Patient Will Call Back to Schedule     []  Patient Will Schedule with External Provider / Order Routed if Applicable     Additional Care Coordinator Notes:         Further Action Needed If Patient Returns Outreach:

## 2023-10-03 ENCOUNTER — OFFICE VISIT (OUTPATIENT)
Dept: FAMILY MEDICINE | Facility: CLINIC | Age: 33
End: 2023-10-03
Payer: COMMERCIAL

## 2023-10-03 VITALS
HEART RATE: 101 BPM | HEIGHT: 62 IN | BODY MASS INDEX: 39.84 KG/M2 | DIASTOLIC BLOOD PRESSURE: 78 MMHG | SYSTOLIC BLOOD PRESSURE: 114 MMHG | OXYGEN SATURATION: 96 % | WEIGHT: 216.5 LBS

## 2023-10-03 DIAGNOSIS — F41.0 PANIC ATTACKS: ICD-10-CM

## 2023-10-03 DIAGNOSIS — Z00.00 PHYSICAL EXAM, ROUTINE: Primary | ICD-10-CM

## 2023-10-03 DIAGNOSIS — M54.59 MECHANICAL LOW BACK PAIN: ICD-10-CM

## 2023-10-03 DIAGNOSIS — R41.840 INATTENTION: ICD-10-CM

## 2023-10-03 DIAGNOSIS — E78.5 DYSLIPIDEMIA: ICD-10-CM

## 2023-10-03 PROCEDURE — 99395 PREV VISIT EST AGE 18-39: CPT | Mod: S$GLB,,, | Performed by: INTERNAL MEDICINE

## 2023-10-03 PROCEDURE — 3074F SYST BP LT 130 MM HG: CPT | Mod: CPTII,S$GLB,, | Performed by: INTERNAL MEDICINE

## 2023-10-03 PROCEDURE — 3008F BODY MASS INDEX DOCD: CPT | Mod: CPTII,S$GLB,, | Performed by: INTERNAL MEDICINE

## 2023-10-03 PROCEDURE — 99395 PR PREVENTIVE VISIT,EST,18-39: ICD-10-PCS | Mod: S$GLB,,, | Performed by: INTERNAL MEDICINE

## 2023-10-03 PROCEDURE — 3044F HG A1C LEVEL LT 7.0%: CPT | Mod: CPTII,S$GLB,, | Performed by: INTERNAL MEDICINE

## 2023-10-03 PROCEDURE — 3008F PR BODY MASS INDEX (BMI) DOCUMENTED: ICD-10-PCS | Mod: CPTII,S$GLB,, | Performed by: INTERNAL MEDICINE

## 2023-10-03 PROCEDURE — 1159F PR MEDICATION LIST DOCUMENTED IN MEDICAL RECORD: ICD-10-PCS | Mod: CPTII,S$GLB,, | Performed by: INTERNAL MEDICINE

## 2023-10-03 PROCEDURE — 3044F PR MOST RECENT HEMOGLOBIN A1C LEVEL <7.0%: ICD-10-PCS | Mod: CPTII,S$GLB,, | Performed by: INTERNAL MEDICINE

## 2023-10-03 PROCEDURE — 1159F MED LIST DOCD IN RCRD: CPT | Mod: CPTII,S$GLB,, | Performed by: INTERNAL MEDICINE

## 2023-10-03 PROCEDURE — 99999 PR PBB SHADOW E&M-EST. PATIENT-LVL IV: CPT | Mod: PBBFAC,,, | Performed by: INTERNAL MEDICINE

## 2023-10-03 PROCEDURE — 3078F DIAST BP <80 MM HG: CPT | Mod: CPTII,S$GLB,, | Performed by: INTERNAL MEDICINE

## 2023-10-03 PROCEDURE — 99999 PR PBB SHADOW E&M-EST. PATIENT-LVL IV: ICD-10-PCS | Mod: PBBFAC,,, | Performed by: INTERNAL MEDICINE

## 2023-10-03 PROCEDURE — 3074F PR MOST RECENT SYSTOLIC BLOOD PRESSURE < 130 MM HG: ICD-10-PCS | Mod: CPTII,S$GLB,, | Performed by: INTERNAL MEDICINE

## 2023-10-03 PROCEDURE — 3078F PR MOST RECENT DIASTOLIC BLOOD PRESSURE < 80 MM HG: ICD-10-PCS | Mod: CPTII,S$GLB,, | Performed by: INTERNAL MEDICINE

## 2023-10-03 RX ORDER — SEMAGLUTIDE 1.34 MG/ML
1 INJECTION, SOLUTION SUBCUTANEOUS WEEKLY
COMMUNITY
Start: 2023-09-11

## 2023-10-03 RX ORDER — CYCLOBENZAPRINE HCL 10 MG
10 TABLET ORAL NIGHTLY
Qty: 30 TABLET | Refills: 2 | Status: SHIPPED | OUTPATIENT
Start: 2023-10-03

## 2023-10-03 RX ORDER — BUSPIRONE HYDROCHLORIDE 5 MG/1
TABLET ORAL
Qty: 90 TABLET | Refills: 1 | Status: SHIPPED | OUTPATIENT
Start: 2023-10-03 | End: 2024-02-20

## 2023-10-03 NOTE — PROGRESS NOTES
Subjective     Carolina Mena is a 33 y.o. old, female here for a health maintenance visit.    34 y/o with PMH of PCOS, anxiety, panic d/o  Patient has concerns about difficulty focusing and possible ADD  Overspending, insomnia, feeling overwhelmed, getting easily distracted.    Health Habits  Exercise and Activity: exercising some but gets out of the habit easily  Diet: losing weight on ozempic, doing well so far  Started vaping again due to stress    Sexual Health  Sexually active: no current issues, more regular menses    Mental Health  Overall doing well, sig work stress, chronic insomnia, buspar prn (more around her menses)    History   History reviewed. No pertinent past medical history.  Past Surgical History:   Procedure Laterality Date    COSMETIC SURGERY  07/11/19    Earlobe repair    EXTERNAL EAR SURGERY       Review of patient's allergies indicates:   Allergen Reactions    Amoxicillin Diarrhea    Cefaclor Hives    Augmentin [amoxicillin-pot clavulanate] Hives    Polysporin [bacitracin-polymyxin b] Other (See Comments)     Burning sensation     Outpatient Medications Marked as Taking for the 10/3/23 encounter (Office Visit) with Gallito Bustamante MD   Medication Sig Dispense Refill    OZEMPIC 1 mg/dose (4 mg/3 mL) Inject 1 mg into the skin once a week.      progesterone (PROMETRIUM) 200 MG capsule 200 mg ORALLY at bedtime for 12 sequential days per 28-day cycle 36 capsule 3    [DISCONTINUED] busPIRone (BUSPAR) 5 MG Tab TAKE 1 TABLET BY MOUTH DAILY AS NEEDED 90 tablet 1    [DISCONTINUED] cyclobenzaprine (FLEXERIL) 10 MG tablet Take 1 tablet (10 mg total) by mouth every evening. 30 tablet 2     Social History     Tobacco Use    Smoking status: Every Day     Types: Vaping with nicotine     Last attempt to quit: 10/21/2019     Years since quitting: 3.9    Smokeless tobacco: Never    Tobacco comments:     Started back vaping a month ago   Substance Use Topics    Alcohol use: Yes     Comment:  "Once every month or two    Drug use: No     Family History   Problem Relation Age of Onset    Drug abuse Mother         Opioids    Mental illness Mother         Depression    Hypertension Paternal Grandmother     Cancer Paternal Grandmother         Breast cancer    Heart disease Paternal Grandfather     Diabetes Paternal Grandfather        Review of Systems   Review of Systems   Constitutional:  Negative for chills and fever.   HENT:  Negative for ear pain and sinus pain.    Eyes:  Negative for blurred vision and pain.   Respiratory:  Negative for cough and shortness of breath.    Cardiovascular:  Negative for chest pain and palpitations.   Gastrointestinal:  Negative for abdominal pain and nausea.   Genitourinary:  Negative for dysuria and frequency.   Musculoskeletal:  Negative for joint pain and myalgias.   Skin:  Negative for itching and rash.   Neurological:  Negative for weakness and headaches.   Endo/Heme/Allergies:  Negative for environmental allergies. Does not bruise/bleed easily.   Psychiatric/Behavioral:  Negative for depression. The patient does not have insomnia.      Objective   /78   Pulse 101   Ht 5' 2" (1.575 m)   Wt 98.2 kg (216 lb 7.9 oz)   LMP 09/14/2023   SpO2 96%   BMI 39.60 kg/m²   Physical Exam  Constitutional:       General: She is not in acute distress.     Appearance: Normal appearance. She is well-developed.   HENT:      Head: Normocephalic and atraumatic.   Eyes:      Conjunctiva/sclera: Conjunctivae normal.      Pupils: Pupils are equal, round, and reactive to light.   Neck:      Thyroid: No thyroid mass or thyromegaly.   Cardiovascular:      Rate and Rhythm: Normal rate and regular rhythm.      Heart sounds: Normal heart sounds. No murmur heard.  Pulmonary:      Effort: No respiratory distress.      Breath sounds: Normal breath sounds.   Abdominal:      General: Bowel sounds are normal.      Palpations: Abdomen is soft.      Tenderness: There is no abdominal " tenderness.   Musculoskeletal:         General: No deformity.      Cervical back: Neck supple.   Lymphadenopathy:      Cervical: No cervical adenopathy.      Upper Body:      Right upper body: No supraclavicular adenopathy.      Left upper body: No supraclavicular adenopathy.   Skin:     General: Skin is warm and dry.      Findings: No rash.   Neurological:      Mental Status: She is alert and oriented to person, place, and time.   Psychiatric:         Behavior: Behavior normal.       Assessment and Plan     Physical exam, routine  -     Lipid Panel; Future; Expected date: 10/03/2023  -     Hemoglobin A1C; Future; Expected date: 10/03/2023  -     TSH; Future; Expected date: 10/03/2023  -     CBC Without Differential; Future; Expected date: 10/03/2023  -     Comprehensive Metabolic Panel; Future; Expected date: 10/03/2023    Dyslipidemia    Panic attacks  -     busPIRone (BUSPAR) 5 MG Tab; TAKE 1 TABLET BY MOUTH DAILY AS NEEDED  Dispense: 90 tablet; Refill: 1    Inattention  -     Ambulatory referral/consult to Psychiatry; Future; Expected date: 10/10/2023    Mechanical low back pain  -     cyclobenzaprine (FLEXERIL) 10 MG tablet; Take 1 tablet (10 mg total) by mouth every evening.  Dispense: 30 tablet; Refill: 2        ___________________  Gallito Bustamante MD  Internal Medicine and Pediatrics

## 2023-10-04 ENCOUNTER — PATIENT MESSAGE (OUTPATIENT)
Dept: FAMILY MEDICINE | Facility: CLINIC | Age: 33
End: 2023-10-04
Payer: COMMERCIAL

## 2023-10-04 ENCOUNTER — PATIENT OUTREACH (OUTPATIENT)
Dept: ADMINISTRATIVE | Facility: HOSPITAL | Age: 33
End: 2023-10-04
Payer: COMMERCIAL

## 2023-10-04 ENCOUNTER — LAB VISIT (OUTPATIENT)
Dept: LAB | Facility: HOSPITAL | Age: 33
End: 2023-10-04
Attending: INTERNAL MEDICINE
Payer: COMMERCIAL

## 2023-10-04 DIAGNOSIS — Z00.00 PHYSICAL EXAM, ROUTINE: ICD-10-CM

## 2023-10-04 LAB
ALBUMIN SERPL BCP-MCNC: 4.2 G/DL (ref 3.5–5.2)
ALP SERPL-CCNC: 108 U/L (ref 55–135)
ALT SERPL W/O P-5'-P-CCNC: 23 U/L (ref 10–44)
ANION GAP SERPL CALC-SCNC: 9 MMOL/L (ref 8–16)
AST SERPL-CCNC: 16 U/L (ref 10–40)
BILIRUB SERPL-MCNC: 0.2 MG/DL (ref 0.1–1)
BUN SERPL-MCNC: 11 MG/DL (ref 6–20)
CALCIUM SERPL-MCNC: 9.6 MG/DL (ref 8.7–10.5)
CHLORIDE SERPL-SCNC: 103 MMOL/L (ref 95–110)
CHOLEST SERPL-MCNC: 244 MG/DL (ref 120–199)
CHOLEST/HDLC SERPL: 7.2 {RATIO} (ref 2–5)
CO2 SERPL-SCNC: 25 MMOL/L (ref 23–29)
CREAT SERPL-MCNC: 0.8 MG/DL (ref 0.5–1.4)
ERYTHROCYTE [DISTWIDTH] IN BLOOD BY AUTOMATED COUNT: 12.9 % (ref 11.5–14.5)
EST. GFR  (NO RACE VARIABLE): >60 ML/MIN/1.73 M^2
ESTIMATED AVG GLUCOSE: 100 MG/DL (ref 68–131)
GLUCOSE SERPL-MCNC: 91 MG/DL (ref 70–110)
HBA1C MFR BLD: 5.1 % (ref 4–5.6)
HCT VFR BLD AUTO: 41.1 % (ref 37–48.5)
HDLC SERPL-MCNC: 34 MG/DL (ref 40–75)
HDLC SERPL: 13.9 % (ref 20–50)
HGB BLD-MCNC: 13.5 G/DL (ref 12–16)
LDLC SERPL CALC-MCNC: 157.6 MG/DL (ref 63–159)
MCH RBC QN AUTO: 26.4 PG (ref 27–31)
MCHC RBC AUTO-ENTMCNC: 32.8 G/DL (ref 32–36)
MCV RBC AUTO: 80 FL (ref 82–98)
NONHDLC SERPL-MCNC: 210 MG/DL
PLATELET # BLD AUTO: 366 K/UL (ref 150–450)
PMV BLD AUTO: 9.6 FL (ref 9.2–12.9)
POTASSIUM SERPL-SCNC: 3.9 MMOL/L (ref 3.5–5.1)
PROT SERPL-MCNC: 7.7 G/DL (ref 6–8.4)
RBC # BLD AUTO: 5.11 M/UL (ref 4–5.4)
SODIUM SERPL-SCNC: 137 MMOL/L (ref 136–145)
TRIGL SERPL-MCNC: 262 MG/DL (ref 30–150)
TSH SERPL DL<=0.005 MIU/L-ACNC: 0.46 UIU/ML (ref 0.4–4)
WBC # BLD AUTO: 9.08 K/UL (ref 3.9–12.7)

## 2023-10-04 PROCEDURE — 85027 COMPLETE CBC AUTOMATED: CPT | Performed by: INTERNAL MEDICINE

## 2023-10-04 PROCEDURE — 83036 HEMOGLOBIN GLYCOSYLATED A1C: CPT | Performed by: INTERNAL MEDICINE

## 2023-10-04 PROCEDURE — 84443 ASSAY THYROID STIM HORMONE: CPT | Performed by: INTERNAL MEDICINE

## 2023-10-04 PROCEDURE — 36415 COLL VENOUS BLD VENIPUNCTURE: CPT | Mod: PO | Performed by: INTERNAL MEDICINE

## 2023-10-04 PROCEDURE — 80053 COMPREHEN METABOLIC PANEL: CPT | Performed by: INTERNAL MEDICINE

## 2023-10-04 PROCEDURE — 80061 LIPID PANEL: CPT | Performed by: INTERNAL MEDICINE

## 2023-10-04 NOTE — LETTER
AUTHORIZATION FOR RELEASE OF   CONFIDENTIAL INFORMATION    Dear [facility],    We are seeing Carolina Mena, date of birth 1990, in the clinic at [facility]. Gallito Bustamante MD is the patient's PCP. Carolina Mena has an outstanding lab/procedure at the time we reviewed her chart. In order to help keep her health information updated, she has authorized us to request the following medical record(s):        (  )  MAMMOGRAM                                      (  )  COLONOSCOPY      ( X )  PAP SMEAR                                          (  )  OUTSIDE LAB RESULTS     (  )  DEXA SCAN                                          (  )  EYE EXAM            (  )  FOOT EXAM                                          (  )  ENTIRE RECORD     (  )  OUTSIDE IMMUNIZATIONS                 (  )  _______________         Please fax records to [facility], Crowder, Jonathan E., MD at [phone]    Thanks so much and have a great day!    Tonya Orozco LPN [facility]  [address] [place] [address]  P- [phone]  F [phone]           Patient Name: Carolina Mena  : 1990  Patient Phone #: [phone]

## 2023-10-05 ENCOUNTER — OFFICE VISIT (OUTPATIENT)
Dept: PSYCHIATRY | Facility: CLINIC | Age: 33
End: 2023-10-05
Payer: COMMERCIAL

## 2023-10-05 ENCOUNTER — PATIENT MESSAGE (OUTPATIENT)
Dept: PSYCHIATRY | Facility: CLINIC | Age: 33
End: 2023-10-05
Payer: COMMERCIAL

## 2023-10-05 VITALS
BODY MASS INDEX: 38.84 KG/M2 | DIASTOLIC BLOOD PRESSURE: 70 MMHG | SYSTOLIC BLOOD PRESSURE: 106 MMHG | HEIGHT: 62 IN | WEIGHT: 211.06 LBS | HEART RATE: 108 BPM

## 2023-10-05 DIAGNOSIS — R41.840 INATTENTION: ICD-10-CM

## 2023-10-05 DIAGNOSIS — F41.9 ANXIETY DISORDER, UNSPECIFIED TYPE: Primary | ICD-10-CM

## 2023-10-05 PROCEDURE — 1160F RVW MEDS BY RX/DR IN RCRD: CPT | Mod: CPTII,S$GLB,,

## 2023-10-05 PROCEDURE — 3074F PR MOST RECENT SYSTOLIC BLOOD PRESSURE < 130 MM HG: ICD-10-PCS | Mod: CPTII,S$GLB,,

## 2023-10-05 PROCEDURE — 90792 PSYCH DIAG EVAL W/MED SRVCS: CPT | Mod: S$GLB,,,

## 2023-10-05 PROCEDURE — 1159F PR MEDICATION LIST DOCUMENTED IN MEDICAL RECORD: ICD-10-PCS | Mod: CPTII,S$GLB,,

## 2023-10-05 PROCEDURE — 99999 PR PBB SHADOW E&M-EST. PATIENT-LVL III: ICD-10-PCS | Mod: PBBFAC,,,

## 2023-10-05 PROCEDURE — 3044F HG A1C LEVEL LT 7.0%: CPT | Mod: CPTII,S$GLB,,

## 2023-10-05 PROCEDURE — 3078F PR MOST RECENT DIASTOLIC BLOOD PRESSURE < 80 MM HG: ICD-10-PCS | Mod: CPTII,S$GLB,,

## 2023-10-05 PROCEDURE — 99999 PR PBB SHADOW E&M-EST. PATIENT-LVL III: CPT | Mod: PBBFAC,,,

## 2023-10-05 PROCEDURE — 1160F PR REVIEW ALL MEDS BY PRESCRIBER/CLIN PHARMACIST DOCUMENTED: ICD-10-PCS | Mod: CPTII,S$GLB,,

## 2023-10-05 PROCEDURE — 1159F MED LIST DOCD IN RCRD: CPT | Mod: CPTII,S$GLB,,

## 2023-10-05 PROCEDURE — 3078F DIAST BP <80 MM HG: CPT | Mod: CPTII,S$GLB,,

## 2023-10-05 PROCEDURE — 90792 PR PSYCHIATRIC DIAGNOSTIC EVALUATION W/MEDICAL SERVICES: ICD-10-PCS | Mod: S$GLB,,,

## 2023-10-05 PROCEDURE — 3044F PR MOST RECENT HEMOGLOBIN A1C LEVEL <7.0%: ICD-10-PCS | Mod: CPTII,S$GLB,,

## 2023-10-05 PROCEDURE — 3074F SYST BP LT 130 MM HG: CPT | Mod: CPTII,S$GLB,,

## 2023-10-05 NOTE — PROGRESS NOTES
"OUTPATIENT PSYCHIATRY INITIAL VISIT    Encounter Date: 10/05/2023    ID: Carolina Yoder, a 33 y.o. female, presenting for initial evaluation visit. Met with patient. Informed of confidentiality rights and limitations. Discussed provider role in the treatment team.    Reason for Encounter: Referral from Gallito Bustamante MD     CC: ADHD evaluation    HISTORY OF PRESENT ILLNESS:   Pt. is a 33 y.o. female, with a past psychiatric hx of anxiety and panic disorder presenting to the clinic for an initial evaluation and treatment. PMHx outlined below. Pt is currently taking buspirone 5 mg daily p.r.n. anxiety.     Patient reports difficulty with attention and focus beginning in childhood.  She notes this difficulty focusing has worsened recently. Grades in school were less than average, C's. B's. A's in PE." She does report anxiety beginning approximately 4-5 years ago, though she states, "I've always been an anxious person." Buspirone 5 mg daily PRN controls her anxiety approximately 80% of the time, however she does take buspirone daily during the week before onset of menses as she expreiences increased anxiety at that time. She does uses skills to reduce anxiety such as distraction or calming techniques.  She does report experiencing palpitations which occur on a daily basis that began in high school.  She states she has seen Cardiology for this in the past with a normal EKG and echo.  She denies a family history of premature sudden cardiac death.  Her grandmother did experience an MI at approximately age 70.    ADHD Screening:  Trouble paying close attention to details, or careless mistakes: yes, skipping over small details and wondering why it doesn't make sense later and having to return an re read; cooking also, if following a recipe  Difficulty sustaining attention/remaining focused: yes, has to have music in the background to keep self focused; if phone dings, "I go down a rabbit hole"   Absent " "minded/wandeing thoughts during conversation: "sometimes, it's 50/50"  Doesn't follow through on instructions, starts tasks but does not finish or easily distracted: yes   Difficulty with organizing: creates lots of lists in order to maintain organization, if I don't have a list I'm going to forget, has set up systems at home, everything has a place   Avoids/dislikes tasks that require sustained attention: "sometimes, depending on the task"   Looses important things: no, but has a specific place because otherwise I will lose them, I do lose my phone frequently    Easily distracted by extraneous stimuli: noises and movement even when driving  Forgetful in daily activities: yes, makes lists   ------  Often fidgets/squirms: yes, uses fidget toys, helps to focus  Often leaves seat at inappropriate times: denies, but works from home  Runs around, climbing on things or feeling restless: no  Unable to engage in leisure activities: yes   Often "on the go" , motor driven: no  Often talks excessively: yes  Blurts out, interrupts: yes and over sharing  Can't wait turn: no  Often interrupts/intrudes: yes      PSYCHIATRIC REVIEW OF SYMPTOMS  Is patient currently experiencing or having changes in:    Mood/Depression:  Mood:  "normal, fine" denies hx of depressed mood  Interest/pleasure/anhedonia: no  Guilt/worthlessness/hopelessness: no  Sleep: +initial insomnia, uses white noise, difficulty maintaining sleep but can return to sleep easily   Energy: +chronic fatigue  Appetite/weight: decreased with Ozempic  Concentration/indecisiveness: difficulty at baseline  Psychomotor activity: no  S.I.B.s/risky behavior: no  SI: no    Anxiety:  Excessive anxiety and worry: yes  Restlessness/'on edge': no  Irritability: no  Muscle tension: no  Difficulty concentrating/mind going blank: yes, concentration difficulty at baseline  Sleep disturbance: yes  Fatigues easily: yes  Panic attacks: yes, 2-3 per month  Agoraphobia: no  Social phobia: " no    PTSD:  Recurrent nightmares: no  Flashbacks: no  Avoidance of stimuli: no  Hyper startle response: no   Dissociative episodes: no    OCD:  Recurrent thoughts: no  Recurrent behaviors: no    La/Hypomania:   Distractibility: no  Indiscretion: no  Grandiosity: no   Racing thoughts/Flight of ideas: no  Increased activity: no  Reduced need for sleep: no  Talkativeness/Pressured speech: no     Psychosis:   A/V hallucinations: no  Delusions: no  Paranoia: no      PSYCHOTROPIC MEDICATION HISTORY (Highest Dose)  denies    PAST PSYCHIATRIC HISTORY:  Psychiatric Care (current & past):  denies  Previous Psychiatric Diagnoses:  JALEN  Previous Psychiatric Hospitalizations: denies  Previous SI/HI:  denies  Previous Suicide Attempts or NSSI: denies  History of Psychotherapy: briefly for several months 5 years ago  History of Violence: denies    PAST MEDICAL HISTORY:   History reviewed. No pertinent past medical history. Wish to become pregnant in the immediate future[if female of childbearing age]: no    NEUROLOGIC HISTORY:  Seizures:  denies   Head trauma:  denies    PAST SURGICAL HISTORY:  Past Surgical History:   Procedure Laterality Date    COSMETIC SURGERY  07/11/19    Earlobe repair    EXTERNAL EAR SURGERY         Review of patient's allergies indicates:   Allergen Reactions    Amoxicillin Diarrhea    Cefaclor Hives    Augmentin [amoxicillin-pot clavulanate] Hives    Polysporin [bacitracin-polymyxin b] Other (See Comments)     Burning sensation        FAMILY HISTORY:   Paternal:  Several distant family members with etohism  Maternal: mother MDD, opioid addiction; etohism   Siblings: siblings with undiagnosed ADHD    SOCIAL HISTORY:   Developmental/Childhood:  born in [place] and raised in [place], childhood described as not the best but not completely bad  Marital Status/Relationship Status:   Children: denies  Resides/Housing Status: in [place]  Occupation/Employment:  for a [employer]  "company, works from home,  Hobbies/Recreational Activities: spending time with friends, travels several times per year, social media - [employer], reading, cooking   Spirituality/Christianity: denies  Education level: high school grad    History: denies  Legal History: denies  Access to firearms: yes, shot gun, "I don't know how to use it. It's kept unloaded."    SUBSTANCE USE HISTORY:  Caffeine: denies  Tobacco: vapes with nicotine   Alcohol: once every 1-2 months, approx 1-3 drinks at a time   Other Substances: THC gummies very occasionally  Rehab: denies      CURRENT MEDICATIONS  Outpatient Encounter Medications as of 10/5/2023   Medication Sig Dispense Refill    busPIRone (BUSPAR) 5 MG Tab TAKE 1 TABLET BY MOUTH DAILY AS NEEDED 90 tablet 1    OZEMPIC 1 mg/dose (4 mg/3 mL) Inject 1 mg into the skin once a week.      progesterone (PROMETRIUM) 200 MG capsule 200 mg ORALLY at bedtime for 12 sequential days per 28-day cycle 36 capsule 3    cyclobenzaprine (FLEXERIL) 10 MG tablet Take 1 tablet (10 mg total) by mouth every evening. 30 tablet 2     No facility-administered encounter medications on file as of 10/5/2023.       LABORATORY DATA  Lab Visit on 10/04/2023   Component Date Value Ref Range Status    Cholesterol 10/04/2023 244 (H)  120 - 199 mg/dL Final    Triglycerides 10/04/2023 262 (H)  30 - 150 mg/dL Final    HDL 10/04/2023 34 (L)  40 - 75 mg/dL Final    LDL Cholesterol 10/04/2023 157.6  63.0 - 159.0 mg/dL Final    HDL/Cholesterol Ratio 10/04/2023 13.9 (L)  20.0 - 50.0 % Final    Total Cholesterol/HDL Ratio 10/04/2023 7.2 (H)  2.0 - 5.0 Final    Non-HDL Cholesterol 10/04/2023 210  mg/dL Final    Hemoglobin A1C 10/04/2023 5.1  4.0 - 5.6 % Final    Estimated Avg Glucose 10/04/2023 100  68 - 131 mg/dL Final    TSH 10/04/2023 0.458  0.400 - 4.000 uIU/mL Final    WBC 10/04/2023 9.08  3.90 - 12.70 K/uL Final    RBC 10/04/2023 5.11  4.00 - 5.40 M/uL Final    Hemoglobin 10/04/2023 13.5  12.0 - 16.0 g/dL Final    " Hematocrit 10/04/2023 41.1  37.0 - 48.5 % Final    MCV 10/04/2023 80 (L)  82 - 98 fL Final    MCH 10/04/2023 26.4 (L)  27.0 - 31.0 pg Final    MCHC 10/04/2023 32.8  32.0 - 36.0 g/dL Final    RDW 10/04/2023 12.9  11.5 - 14.5 % Final    Platelets 10/04/2023 366  150 - 450 K/uL Final    MPV 10/04/2023 9.6  9.2 - 12.9 fL Final    Sodium 10/04/2023 137  136 - 145 mmol/L Final    Potassium 10/04/2023 3.9  3.5 - 5.1 mmol/L Final    Chloride 10/04/2023 103  95 - 110 mmol/L Final    CO2 10/04/2023 25  23 - 29 mmol/L Final    Glucose 10/04/2023 91  70 - 110 mg/dL Final    BUN 10/04/2023 11  6 - 20 mg/dL Final    Creatinine 10/04/2023 0.8  0.5 - 1.4 mg/dL Final    Calcium 10/04/2023 9.6  8.7 - 10.5 mg/dL Final    Total Protein 10/04/2023 7.7  6.0 - 8.4 g/dL Final    Albumin 10/04/2023 4.2  3.5 - 5.2 g/dL Final    Total Bilirubin 10/04/2023 0.2  0.1 - 1.0 mg/dL Final    Alkaline Phosphatase 10/04/2023 108  55 - 135 U/L Final    AST 10/04/2023 16  10 - 40 U/L Final    ALT 10/04/2023 23  10 - 44 U/L Final    eGFR 10/04/2023 >60.0  >60 mL/min/1.73 m^2 Final    Anion Gap 10/04/2023 9  8 - 16 mmol/L Final   Patient Outreach on 10/04/2023   Component Date Value Ref Range Status    PAP Recommendation External 11/24/2020 Pap in 3 years   Final    Pap 11/24/2020 Negative for intraephithelial lesion or malignancy  Negative for intraephithelial lesion or malignancy, Other Final       MEDICAL REVIEW OF SYSTEMS:  Pain: Denies any significant chronic or acute pain.  Constitutional: Denies fever or change in appetite.  Cardiovascular: Denies chest pain or exertional dyspnea.  Respiratory: Denies cough or orthopnea.   GI: Denies abdominal pain, N/V  Neurological: Denies tremor, seizure, or focal weakness.  Psychiatric: See HPI above.    EXAM:  Nutritional Screening: Considering the patient's height and weight, medications, medical history and preferences, should a referral be made to the dietitian? No    Vitals: most recent vital signs were  "reviewed:  /70   Pulse 108   Ht 5' 2" (1.575 m)   Wt 95.8 kg (211 lb 1.5 oz)   LMP 09/14/2023   BMI 38.61 kg/m²     General: age appropriate, well nourished, casually dressed, neatly groomed  MSK: muscle strength/tone: no tremor or abnormal movements.   Gait/Station: no ataxia, steady    PSYCHIATRIC:  Speech: Normal rate, rhythm, volume. No latency, no pressured speech  Mood/Affect: euthymic, congruent, and appropriate   Though Process: organized, logical, linear  Thought Content: no suicidal or homicidal ideation, no A/V hallucinations, delusions, or paranoia  Insight: Intact; aware of illness  Judgement: behavior is adequate to circumstances  Orientation: A&O x 4  Memory: Intact for content of interview, 3/3 immediate, 3/3 after 3 mins. Able to recall recent and remote events.  Language: Grossly intact, no aphasias   Concentration: Spells "world" correctly forward & backwards  Knowledge/Intelligence: appropriate to age and level of education.     SUICIDE RISK ASSESSMENT:  Protective factors: age, gender, no prior attempts, no prior hospitalizations, no family h/o attempts, no ongoing substance abuse, no psychosis, , denies SI/intent/plan, seeking treatment, access to treatment, future oriented, good primary support  Risks:  Access to firearms, history of anxiety  Patient is a low immediate and long-term risk considering risk factors.       IMPRESSION:    Carolina Yoder is a 33 y.o. female that appears to be a reliable informant and is committed to working towards the goals of the treatment plan. Patient has a history of anxiety and panic d/o. Presents today with symptoms of unspecified anxiety disorder and inattention, see HPI.       DIAGNOSES:    ICD-10-CM ICD-9-CM   1. Anxiety disorder, unspecified type  F41.9 300.00   2. Inattention  R41.840 799.51         STRENGTHS AND LIABILITIES: Strength: Patient accepts guidance/feedback, Strength: Patient is expressive/articulate., Strength: Patient is " intelligent., Strength: Patient is motivated for change., Strength: Patient is physically healthy., Liability: Patient lacks coping skills.    TREATMENT GOALS:      Anxiety: Identify coping skills including deep breathing, grounding, time set aside for worry, and other identified skills, decrease in presenting anxiety related symptoms, and increased client rating of quality of life. Participate in psychotherapy as indicted. Medication adherence.    ADHD: Decrease in symptoms of inattentiveness, hyperactivity, and impulsiveness. Improvement in concentration as evidenced by enhanced work performance and at home behaviors. Identify skills to aid in managing symptoms including organizational skills, and identifying consequences and results of specified behaviors. Participate in psychotherapy as indicated. Medication adherence.        PLAN:  Continue buspirone 5 mg daily p.r.n. anxiety  Referred for further psychological evaluation for diagnostic clarity regarding inattention  Offered referral for individual psychotherapy      Return to Clinic: 1 month      MARCELINA Espinosa, PMHNP-BC      60 minutes spent on this encounter, >50% time spent in counseling.     At this time there are no indications the patient represents an imminent danger to either themselves or others; will continue to manage treatment in the outpatient setting.    I discussed the patient's care with the patient including benefits, alternatives, possible adverse effects of the treatment plan; including the potential for metabolic complications, major organ dysfunction, black box warnings, and contraindications. The opportunity was given for questions/clarification, and after this discussion the above treatment plan was devised through shared decision making. The patient voiced their understanding of the diagnoses and treatments listed above and agreed to the treatment plan. Follow up plan was reviewed with the patient. The patient was advised to call  "to report any worsening of symptoms or problems with medication.    Supportive therapy and psychoeducation provided. Patient has been given crisis information including Suicide and Crisis Lifeline (call or text: 638). Patient also given instructions to go to the nearest ER or call 911 if unable to remain safe or if the Pt develops thoughts of harming self or others.    Reviewed past records regarding symptoms and treatments that have brought the patient to today's visit.     [facility]: Reviewed today to detect potential controlled substance misuse, diversion, excessive prescribing, or multiple providers prescribing controlled substances. The patients report was deemed appropriate without new medications of concerns prescribed by other providers.    Documentation entered by me for this encounter may have been done in part using [employer] Direct voice recognition transcription software. Garbled syntax, mangled pronouns, and other bizarre constructions may be attributed to that software system. Although I have made an effort to ensure accuracy, "sound like" errors may exist and should be interpreted in context.    "

## 2023-10-20 ENCOUNTER — OFFICE VISIT (OUTPATIENT)
Dept: PSYCHIATRY | Facility: CLINIC | Age: 33
End: 2023-10-20
Payer: COMMERCIAL

## 2023-10-20 DIAGNOSIS — F90.0 ADHD (ATTENTION DEFICIT HYPERACTIVITY DISORDER), INATTENTIVE TYPE: ICD-10-CM

## 2023-10-20 PROCEDURE — 96139 PR PSYCH/NEUROPSYCH TEST ADMIN/SCORING, BY TECH, 2+ TESTS, EA ADDTL 30 MIN: ICD-10-PCS | Mod: 95,,, | Performed by: PSYCHOLOGIST

## 2023-10-20 PROCEDURE — 96130 PR PSYCHOLOGIC TEST EVAL SVCS, 1ST HR: ICD-10-PCS | Mod: 95,,, | Performed by: PSYCHOLOGIST

## 2023-10-20 PROCEDURE — 96138 PSYCL/NRPSYC TECH 1ST: CPT | Mod: 95,,, | Performed by: PSYCHOLOGIST

## 2023-10-20 PROCEDURE — 99499 UNLISTED E&M SERVICE: CPT | Mod: 95,,, | Performed by: PSYCHOLOGIST

## 2023-10-20 PROCEDURE — 96131 PR PSYCHOLOGIC TEST EVAL SVCS, EA ADDTL HR: ICD-10-PCS | Mod: 95,,, | Performed by: PSYCHOLOGIST

## 2023-10-20 PROCEDURE — 96130 PSYCL TST EVAL PHYS/QHP 1ST: CPT | Mod: 95,,, | Performed by: PSYCHOLOGIST

## 2023-10-20 PROCEDURE — 99499 NO LOS: ICD-10-PCS | Mod: 95,,, | Performed by: PSYCHOLOGIST

## 2023-10-20 PROCEDURE — 96131 PSYCL TST EVAL PHYS/QHP EA: CPT | Mod: 95,,, | Performed by: PSYCHOLOGIST

## 2023-10-20 PROCEDURE — 96138 PR PSYCH/NEUROPSYCH TEST ADMIN/SCORING, BY TECH, 2+ TESTS, 1ST 30 MIN: ICD-10-PCS | Mod: 95,,, | Performed by: PSYCHOLOGIST

## 2023-10-20 PROCEDURE — 96139 PSYCL/NRPSYC TST TECH EA: CPT | Mod: 95,,, | Performed by: PSYCHOLOGIST

## 2023-10-20 NOTE — PROGRESS NOTES
The patient location is: [place], [place]  The chief complaint leading to consultation is: ADHD evaluation  Visit type: Virtual visit with synchronous audio and video  Each patient to whom he or she provides medical services by telemedicine is:  (1) informed of the relationship between the physician and patient and the respective role of any other health care provider with respect to management of the patient; and (2) notified that he or she may decline to receive medical services by telemedicine and may withdraw from such care at any time.  Face-to-Face time: 31 minutes    Notes:      Outpatient Psychological Consultation    Name: Carolina Yoder  MRN: [id number]  : 1990    Testing appointment: 10/09/2023    ID:  Patient presents for consultation for diagnostic clarity in regard to difficulties with attention/concentration    Reason for encounter Referral from Torri Clifford NP     Psychiatric records were reviewed prior to the diagnostic interview. Comprehensive psychological assessment will not be documented in this report rather will be focused on the referral question. See prior notes for comprehensive psychiatric work-up.    Chief Complaint: Pt is a 33 year old female presenting as a referral from Torri Clifford NP for diagnostic clarification due to report of difficulty concentration/poor attention    Psychological Assessments Administered  Wender [place] Rating Scale for the Attention Deficit Hyperactivity Disorder  Olvin Adult ADHD Rating Scales-IV: Other-Report, current symptoms  Olvin Adult ADHD Rating Scales-IV: Other-Report, childhood symptoms  Ezekiel Continuous Performance Test 3  The Dot Counting Test    Results    Wender [place] Rating Scale for the Attention Deficit Hyperactivity Disorder  The Wender [place] Rating Scale can be used to assess adults for Attention Deficit Hyperactivity Disorder with a subset of 25 questions associated with that diagnosis. It is a retrospective diagnosis  of childhood ADHD.      Wender [place] Rating Scale Subscore = 66 (sum of the 25 questions endorsed that are associated with ADHD)    Scores vary from 1-100, and the cutoff score is 46.    Given pt's report of their own symptoms of ADHD in childhood, their response style does support a diagnosis of ADHD.    Olvin Adult ADHD Rating Scales-IV: Other-Report, current symptoms  The BAARS-IV: Other-Report, current symptoms is a collateral measure of the patient's current symptoms of ADHD, as noted by someone with knowledge of the patient's executive functioning.    Lucian Yoder is the evaluator whose relationship to pt is her spouse.     Inattention total score: 18      Hyperactivity total score: 11      Impulsivity total score: 8      Sluggish Cognitive Tempo total score: 20    ADHD total score (sum of Inattention/Hyperactivity/Impulsivity Subsections): 37       Inattention Symptom Count: 2     Hyperactivity/Impulsivity Symptom Count: 1   ADHD Symptom Count total (sum of Inattention/Hyperactivity/Impulsivity Subsections): 3     Based on the evaluator's report of pt's symptoms, pt only struggles with 2 symptoms of inattention but 1 symptoms of hyperactivity/impulsivity. This does not support a diagnosis of ADHD.    Olvin Adult ADHD Rating Scales-IV: Other-Report, childhood symptoms  The BAARS-IV: Other-Report, childhood symptoms is a collateral measure of the patient's symptoms of ADHD between the ages of 5-12 years old, as reported by someone with knowledge of the patient's symptoms during childhood.    Jayla Mnea is the evaluator whose relationship to pt is her mother.     Inattention total score: 24   Hyperactivity/Impulsivity total score: 23     Inattention Symptom Count: 6   Hyperactivity/Impulsivity Symptom Count: 5     Based on the evaluator's report of symptoms, pt did exhibit sufficient symptoms of inattention, hyperactivity, and impulsivity that affected multiple settings at an early age to meet the DSM-5  diagnosis criteria for an ADHD diagnosis.     Ezekiel Continuous Performance Test 3  The Ezekiel Continuous Performance Test 3rd Edition (Ezekiel CPT 3) is an objective, task-oriented computerized assessment of attention-related problems. This measure creates an index of the respondent's performance in areas of inattentiveness, impulsivity, sustained attention, and vigilance.    Pt's performance on this objective measure does indicate some difficulty with sustained attention but no difficulty with hyperactivity, impulsivity, ordifficulty maintaining vigilance with varying levels of stimulus frequency.    The Dot Counting Test  The Dot Counting Test (DCT) is a brief task that assesses test-taking effort in individuals.     Based on patient performance and score, pt appeared to demonstrate good effort.    Interpretation    Pt is a 33 year old female presenting as a referral from Torri Clifford NP for diagnostic clarification due to report of difficulty concentration/poor attention. Pt reports attention/concentration concerns consistent with ADHD, predominantly inattentive type. Pt explained that her symptoms were present before the age of 14 and cause impairment in more than one setting.    Diagnosis     Attention-Deficit / Hyperactivity Disorder, predominately inattentive    Plan and Recommendations    Return for further psychiatric medication management with Torri Clifford NP    Attention Tips Remember that inattention and lack of focus are major culprits to forgetting information so be sure and practice paying attention for adequate learning of information. If you rely on passive attention to remembering something (e.g., yeah, uh-huh approach), you'll find you cannot recall it later. I recommend the following to improve attention, which may aid in later recall:   Reduce distractions as much as possible.  Look at the person as they are speaking to you.   Paraphrase as they are speaking  Write down important pieces  of information   Ask people to repeat if you zone out.    Have visual cues  (posted to-do-list, daily schedule) to remind you if you need to do something later.   Processing Speed Tips Using multiple modalities (e.g., listening, writing notes, asking questions, recording) to learn new information is likely to allow additional time for processing, thus improving memory for the material.   Allowing sufficient time to complete tasks will reduce frustration and help to ensure completion.  Spend a lot of up-front time planning in advance how long a task may take and then chunk steps in the task so you don't wear yourself out.   Executive Functioning Tips: Don't attempt to multi-task.  Separate tasks so that each can be completed one at a time.  Consider using a calendar/day planner, as that may be effective to help you plan and stay on track.  Color-coding specific tasks by importance may add additional benefit to your planner.  Break down large projects into smaller tasks and write down the steps to completing the task.       BILLIN, [id number] X2 (90 minutes of testing and scoring with psychometrist), [id number], [id number] (2 hours of report writing, evaluation and feedback)

## 2023-11-17 ENCOUNTER — TELEPHONE (OUTPATIENT)
Dept: PSYCHIATRY | Facility: CLINIC | Age: 33
End: 2023-11-17
Payer: COMMERCIAL

## 2023-11-21 ENCOUNTER — OFFICE VISIT (OUTPATIENT)
Dept: PSYCHIATRY | Facility: CLINIC | Age: 33
End: 2023-11-21
Payer: COMMERCIAL

## 2023-11-21 DIAGNOSIS — F90.0 ADHD (ATTENTION DEFICIT HYPERACTIVITY DISORDER), INATTENTIVE TYPE: Primary | ICD-10-CM

## 2023-11-21 DIAGNOSIS — F41.9 ANXIETY DISORDER, UNSPECIFIED TYPE: ICD-10-CM

## 2023-11-21 PROCEDURE — 1159F MED LIST DOCD IN RCRD: CPT | Mod: CPTII,95,,

## 2023-11-21 PROCEDURE — 3044F HG A1C LEVEL LT 7.0%: CPT | Mod: CPTII,95,,

## 2023-11-21 PROCEDURE — 99214 PR OFFICE/OUTPT VISIT, EST, LEVL IV, 30-39 MIN: ICD-10-PCS | Mod: 95,,,

## 2023-11-21 PROCEDURE — 1159F PR MEDICATION LIST DOCUMENTED IN MEDICAL RECORD: ICD-10-PCS | Mod: CPTII,95,,

## 2023-11-21 PROCEDURE — 1160F PR REVIEW ALL MEDS BY PRESCRIBER/CLIN PHARMACIST DOCUMENTED: ICD-10-PCS | Mod: CPTII,95,,

## 2023-11-21 PROCEDURE — 1160F RVW MEDS BY RX/DR IN RCRD: CPT | Mod: CPTII,95,,

## 2023-11-21 PROCEDURE — 3044F PR MOST RECENT HEMOGLOBIN A1C LEVEL <7.0%: ICD-10-PCS | Mod: CPTII,95,,

## 2023-11-21 PROCEDURE — 99214 OFFICE O/P EST MOD 30 MIN: CPT | Mod: 95,,,

## 2023-11-21 RX ORDER — ATOMOXETINE 40 MG/1
40 CAPSULE ORAL DAILY
Qty: 30 CAPSULE | Refills: 0 | Status: SHIPPED | OUTPATIENT
Start: 2023-11-21 | End: 2024-01-24

## 2023-11-21 NOTE — PROGRESS NOTES
"OUTPATIENT PSYCHIATRY FOLLOW UP VISIT    Encounter Date: 11/21/2023    Clinical Status of Patient:  Outpatient (Ambulatory)    Chief Complaint:  Carolina Yoder is a 33 y.o. female who presents today for follow-up.  Met with patient.      HISTORY OF PRESENTING ILLNESS:  Carolina Yoder is a 33 y.o. female with history of ADHD-IT and unspecified anxiety disorder who presents for follow up appointment.      INITIAL HPI:  Pt. is a 33 y.o. female, with a past psychiatric hx of anxiety and panic disorder presenting to the clinic for an initial evaluation and treatment. PMHx outlined below. Pt is currently taking buspirone 5 mg daily p.r.n. anxiety.      Patient reports difficulty with attention and focus beginning in childhood.  She notes this difficulty focusing has worsened recently. Grades in school were less than average, C's. B's. A's in PE." She does report anxiety beginning approximately 4-5 years ago, though she states, "I've always been an anxious person." Buspirone 5 mg daily PRN controls her anxiety approximately 80% of the time, however she does take buspirone daily during the week before onset of menses as she expreiences increased anxiety at that time. She does uses skills to reduce anxiety such as distraction or calming techniques.  She does report experiencing palpitations which occur on a daily basis that began in high school.  She states she has seen Cardiology for this in the past with a normal EKG and echo.  She denies a family history of premature sudden cardiac death.  Her grandmother did experience an MI at approximately age 70.     ADHD Screening:  Trouble paying close attention to details, or careless mistakes: yes, skipping over small details and wondering why it doesn't make sense later and having to return an re read; cooking also, if following a recipe  Difficulty sustaining attention/remaining focused: yes, has to have music in the background to keep self focused; if phone dings, "I go " "down a rabbit hole"   Absent minded/wandeing thoughts during conversation: "sometimes, it's 50/50"  Doesn't follow through on instructions, starts tasks but does not finish or easily distracted: yes   Difficulty with organizing: creates lots of lists in order to maintain organization, if I don't have a list I'm going to forget, has set up systems at home, everything has a place   Avoids/dislikes tasks that require sustained attention: "sometimes, depending on the task"   Looses important things: no, but has a specific place because otherwise I will lose them, I do lose my phone frequently    Easily distracted by extraneous stimuli: noises and movement even when driving  Forgetful in daily activities: yes, makes lists   ------  Often fidgets/squirms: yes, uses fidget toys, helps to focus  Often leaves seat at inappropriate times: denies, but works from home  Runs around, climbing on things or feeling restless: no  Unable to engage in leisure activities: yes   Often "on the go" , motor driven: no  Often talks excessively: yes  Blurts out, interrupts: yes and over sharing  Can't wait turn: no  Often interrupts/intrudes: yes      Plan at last appointment on 10/5/2023:  Continue buspirone 5 mg daily p.r.n. anxiety  Referred for further psychological evaluation for diagnostic clarity regarding inattention  Offered referral for individual psychotherapy    Psychotropic medication history:   denies       INTERVAL HISTORY:    ADHD-IT symptoms continue unchanged.    Pt states she would prefer non-stimulant treatment d/t anxiety disorder.    Denies personal or fly cardiac hx.    No interval episodes with symptoms consistent with jose or hypomania.  Denied interval or current suicidal/homicidal thoughts, intent, or plan or NSSI.  Denied other questions and concerns.    Medication side effects: None  Medication adherence: yes    PSYCHIATRIC REVIEW OF SYSTEMS:  Is patient experiencing or having changes in:  Trouble with sleep:  " +initial insomnia, uses white noise, difficulty maintaining sleep but can return to sleep easily; some difficulty sleeping recently but sleeping 8-9 hours per night  Appetite changes:  decreased with Ozempic  Weight changes:  no  Lack of energy:  +chronic fatigue  Anhedonia:  no  Somatic symptoms:  no  Anxiety/panic:  well controlled, typically experiences increased anxiety with PMS  Irritability: no  Guilty/hopeless:  no  Concentration: difficulty at baseline- ADHD-IT  Racing thoughts: no  Impulsive behaviors: no  Paranoia/AVH: no  Self-injurious behavior/risky behavior:  no  Any drugs:  no  Alcohol:  no    MEDICAL REVIEW OF SYSTEMS:   Pain: Denies any significant chronic or acute pain.  Constitutional: Denies fever or change in appetite.  Cardiovascular: Denies chest pain or exertional dyspnea.  Respiratory: Denies cough or orthopnea.   GI: Denies abdominal pain, N/V  Neurological: Denies tremor, seizure, or focal weakness.  Psychiatric: See HPI above.    PAST PSYCHIATRIC, MEDICAL, AND SOCIAL HISTORY REVIEWED  The patient's past medical, family and social history have been reviewed and updated as appropriate within the electronic medical record - see encounter notes.    PAST MEDICAL HISTORY:   No past medical history on file. Head trauma/Loss of consciousness: denies  Seizures: denies     PAST PSYCHIATRIC HISTORY:  Psychiatric Care (current & past):  denies  Previous Psychiatric Diagnoses:  JALEN  Previous Psychiatric Hospitalizations: denies  Previous SI/HI:  denies  Previous Suicide Attempts or NSSI: denies  History of Psychotherapy: briefly for several months 5 years ago  History of Violence: denies     FAMILY HISTORY:   Paternal:  Several distant family members with etohism  Maternal: mother MDD, opioid addiction; etohism   Siblings: siblings with undiagnosed ADHD     SOCIAL HISTORY:   Developmental/Childhood:  born in [place] and raised in [place], childhood described as not the best but not completely  "bad  Marital Status/Relationship Status:   Children: denies  Resides/Housing Status: in [place]  Occupation/Employment:  for a [employer] company, works from home,  Hobbies/Recreational Activities: spending time with friends, travels several times per year, social media - [employer], reading, cooking   Spirituality/Sabianism: denies  Education level: high school grad    History: denies  Legal History: denies  Access to firearms: yes, shot gun, "I don't know how to use it. It's kept unloaded."     SUBSTANCE USE HISTORY:  Caffeine: denies  Tobacco: vapes with nicotine   Alcohol: once every 1-2 months, approx 1-3 drinks at a time   Other Substances: THC gummies very occasionally  Rehab: denies      MEDICATIONS:    Current Outpatient Medications:     atomoxetine (STRATTERA) 40 MG capsule, Take 1 capsule (40 mg total) by mouth once daily., Disp: 30 capsule, Rfl: 0    busPIRone (BUSPAR) 5 MG Tab, TAKE 1 TABLET BY MOUTH DAILY AS NEEDED, Disp: 90 tablet, Rfl: 1    cyclobenzaprine (FLEXERIL) 10 MG tablet, Take 1 tablet (10 mg total) by mouth every evening., Disp: 30 tablet, Rfl: 2    OZEMPIC 1 mg/dose (4 mg/3 mL), Inject 1 mg into the skin once a week., Disp: , Rfl:     progesterone (PROMETRIUM) 200 MG capsule, 200 mg ORALLY at bedtime for 12 sequential days per 28-day cycle, Disp: 36 capsule, Rfl: 3    ALLERGIES:  Review of patient's allergies indicates:   Allergen Reactions    Amoxicillin Diarrhea    Cefaclor Hives    Augmentin [amoxicillin-pot clavulanate] Hives    Polysporin [bacitracin-polymyxin b] Other (See Comments)     Burning sensation       EXAM:  Constitutional  Vitals:  Most recent vital signs were reviewed.   Last 3 sets of VS:      10/13/2022    11:13 AM 10/3/2023     1:08 PM 10/5/2023     9:05 AM   Vitals - 1 value per visit   SYSTOLIC 126 114 106   DIASTOLIC 78 78 70   Pulse 86 101 108   SPO2 96 % 96 %    Weight (lb) 239.64 216.49 211.09   Weight (kg) 108.7 98.2 95.75   Height " "5' 2" (1.575 m) 5' 2" (1.575 m) 5' 2" (1.575 m)   BMI (Calculated) 43.8 39.6 38.6   Pain Score Two Zero Zero      General:  unremarkable, age appropriate     Musculoskeletal  Muscle Strength/Tone:  No tremors appreciated   Gait & Station:  Unable to assess, Pt seated during virtual visit     Psychiatric  Speech:  no latency; no press   Mood & Affect:  euthymic  congruent and appropriate   Thought Process:  normal and logical   Associations:  intact   Thought Content:  normal, no suicidality, no homicidality, delusions, or paranoia   Insight:  intact   Judgement: behavior is adequate to circumstances   Orientation:  grossly intact   Memory: intact for content of interview   Language: grossly intact   Attention Span & Concentration:  able to focus   Fund of Knowledge:  intact and appropriate to age and level of education     SUICIDE RISK ASSESSMENT:  Protective factors: age, gender, no prior attempts, no prior hospitalizations, no family h/o attempts, no ongoing substance abuse, no psychosis, , denies SI/intent/plan, seeking treatment, access to treatment, future oriented, good primary support  Risks:  Access to firearms, history of anxiety and ADHD-IT  Patient is a low immediate and long-term risk considering risk factors.        RELEVANT LABS/STUDIES:    Lab Results   Component Value Date    WBC 9.08 10/04/2023    HGB 13.5 10/04/2023    HCT 41.1 10/04/2023    MCV 80 (L) 10/04/2023     10/04/2023     BMP  Lab Results   Component Value Date     10/04/2023    K 3.9 10/04/2023     10/04/2023    CO2 25 10/04/2023    BUN 11 10/04/2023    CREATININE 0.8 10/04/2023    CALCIUM 9.6 10/04/2023    ANIONGAP 9 10/04/2023     Lab Results   Component Value Date    ALT 23 10/04/2023    AST 16 10/04/2023    ALKPHOS 108 10/04/2023    BILITOT 0.2 10/04/2023     Lab Results   Component Value Date    TSH 0.458 10/04/2023     Lab Results   Component Value Date    HGBA1C 5.1 10/04/2023     Lab Results   Component " Value Date    CHOL 244 (H) 10/04/2023    TRIG 262 (H) 10/04/2023    HDL 34 (L) 10/04/2023    LDLCALC 157.6 10/04/2023    CHOLHDL 13.9 (L) 10/04/2023    TOTALCHOLEST 7.2 (H) 10/04/2023       IMPRESSION:    Carolina Yoder is a 33 y.o. female with history of unspecified anxiety disorder and ADHD-IT who presents for follow up appointment.    Status/Progress: Based on the examination today, the patient's problem(s) is/are inadequately controlled.  New problems have not been presented today.   Co-morbidities are complicating management of the primary condition.  There are no active rule-out diagnoses for this patient at this time.     Risk Parameters:  Patient reports no suicidal ideation  Patient reports no homicidal ideation  Patient reports no self-injurious behavior  Patient reports no violent behavior    DIAGNOSES:    ICD-10-CM ICD-9-CM   1. ADHD (attention deficit hyperactivity disorder), inattentive type  F90.0 314.00   2. Anxiety disorder, unspecified type  F41.9 300.00       PLAN:  START atomoxetine 40 mg daily for ADHD-IT  Continue buspirone 5 mg daily p.r.n. anxiety  Offered referral for individual psychotherapy    RETURN TO CLINIC:   1 month      MARCELINA Espinosa, PMHNP-BC      25 minutes of total time spent on the encounter, which includes face to face time and non-face to face time preparing to see the patient (eg. review of tests), obtaining and/or reviewing separately obtained history, documenting clinical information in the electronic health record, independently interpreting results (not separately reported), and communicating results to the patient/family/caregiver, or care coordination (not separately reported).     At this time there are no indications the patient represents an imminent danger to either themselves or others; will continue to manage treatment in the outpatient setting.    I discussed the patient's care with the patient including benefits, alternatives, possible adverse effects of  "the treatment plan; including the potential for metabolic complications, major organ dysfunction, black box warnings, and contraindications. The opportunity was given for questions/clarification, and after this discussion the above treatment plan was devised through shared decision making. The patient voiced their understanding of the diagnoses and treatments listed above and agreed to the treatment plan. Follow up plan was reviewed with the patient. The patient was advised to call to report any worsening of symptoms or problems with medication.    Supportive therapy and psychoeducation provided. Patient has been given crisis information including Suicide and Crisis Lifeline (call or text: [phone]). Patient also given instructions to go to the nearest ER or call 911 if unable to remain safe or if the Pt develops thoughts of harming self or others.    Documentation entered by me for this encounter may have been done in part using [employer] voice recognition transcription software. Garbled syntax, mangled pronouns, and other bizarre constructions may be attributed to that software system. Although I have made an effort to ensure accuracy, "sound like" errors may exist and should be interpreted in context.    "

## 2024-01-24 ENCOUNTER — OFFICE VISIT (OUTPATIENT)
Dept: PSYCHIATRY | Facility: CLINIC | Age: 34
End: 2024-01-24
Payer: COMMERCIAL

## 2024-01-24 DIAGNOSIS — F41.9 ANXIETY DISORDER, UNSPECIFIED TYPE: ICD-10-CM

## 2024-01-24 DIAGNOSIS — F90.0 ADHD (ATTENTION DEFICIT HYPERACTIVITY DISORDER), INATTENTIVE TYPE: Primary | ICD-10-CM

## 2024-01-24 PROCEDURE — 1159F MED LIST DOCD IN RCRD: CPT | Mod: CPTII,95,,

## 2024-01-24 PROCEDURE — 99214 OFFICE O/P EST MOD 30 MIN: CPT | Mod: 95,,,

## 2024-01-24 PROCEDURE — 1160F RVW MEDS BY RX/DR IN RCRD: CPT | Mod: CPTII,95,,

## 2024-01-24 RX ORDER — ATOMOXETINE 10 MG/1
10 CAPSULE ORAL DAILY
Qty: 30 CAPSULE | Refills: 0 | Status: SHIPPED | OUTPATIENT
Start: 2024-01-24 | End: 2024-02-23

## 2024-01-24 NOTE — PROGRESS NOTES
"OUTPATIENT PSYCHIATRY FOLLOW UP VISIT    Encounter Date: 1/24/2024    Clinical Status of Patient:  Outpatient (Virtual)  The patient location is: [address] [place] [address]  The patient phone number is: [phone]   Visit type: Virtual visit with synchronous audio and video  Each patient to whom he or she provides medical services by telemedicine is:  (1) informed of the relationship between the practitioner and patient and the respective role of any other health care provider with respect to management of the patient; and (2) notified that he or she may decline to receive medical services by telemedicine and may withdraw from such care at any time.    Chief Complaint:  Carolina Yoder is a 33 y.o. female who presents today for follow-up.  Met with patient.      HISTORY OF PRESENTING ILLNESS:  Carolina Yoder is a 33 y.o. female with history of ADHD-IT and unspecified anxiety disorder who presents for follow up appointment.      INITIAL HPI:  Pt. is a 33 y.o. female, with a past psychiatric hx of anxiety and panic disorder presenting to the clinic for an initial evaluation and treatment. PMHx outlined below. Pt is currently taking buspirone 5 mg daily p.r.n. anxiety.      Patient reports difficulty with attention and focus beginning in childhood.  She notes this difficulty focusing has worsened recently. Grades in school were less than average, C's. B's. A's in PE." She does report anxiety beginning approximately 4-5 years ago, though she states, "I've always been an anxious person." Buspirone 5 mg daily PRN controls her anxiety approximately 80% of the time, however she does take buspirone daily during the week before onset of menses as she expreiences increased anxiety at that time. She does use skills to reduce anxiety such as distraction or calming techniques.  She does report experiencing palpitations which occur on a daily basis that began in high school.  She states she has seen Cardiology " "for this in the past with a normal EKG and echo.  She denies a family history of premature sudden cardiac death.  Her grandmother did experience an MI at approximately age 70.     ADHD Screening:  Trouble paying close attention to details, or careless mistakes: yes, skipping over small details and wondering why it doesn't make sense later and having to return an re read; cooking also, if following a recipe  Difficulty sustaining attention/remaining focused: yes, has to have music in the background to keep self focused; if phone dings, "I go down a rabbit hole"   Absent minded/wandeing thoughts during conversation: "sometimes, it's 50/50"  Doesn't follow through on instructions, starts tasks but does not finish or easily distracted: yes   Difficulty with organizing: creates lots of lists in order to maintain organization, if I don't have a list I'm going to forget, has set up systems at home, everything has a place   Avoids/dislikes tasks that require sustained attention: "sometimes, depending on the task"   Looses important things: no, but has a specific place because otherwise I will lose them, I do lose my phone frequently    Easily distracted by extraneous stimuli: noises and movement even when driving  Forgetful in daily activities: yes, makes lists   ------  Often fidgets/squirms: yes, uses fidget toys, helps to focus  Often leaves seat at inappropriate times: denies, but works from home  Runs around, climbing on things or feeling restless: no  Unable to engage in leisure activities: yes   Often "on the go" , motor driven: no  Often talks excessively: yes  Blurts out, interrupts: yes and over sharing  Can't wait turn: no  Often interrupts/intrudes: yes      11/21/2023: ADHD-IT symptoms continue unchanged.  Pt states she would prefer non-stimulant treatment d/t anxiety disorder.  Denies personal or fly cardiac hx.      Plan at last appointment:  START atomoxetine 40 mg daily for ADHD-IT  Continue buspirone 5 mg " "daily p.r.n. anxiety  Offered referral for individual psychotherapy    Psychotropic medication history:   denies       INTERVAL HISTORY:    Patient reports she did not start atomoxetine in the interim after looking up side effects on the Internet.  She would prefer to start at the 10 mg daily dose.  She is concerned about worsening of anxiety.  Reports increased anxiety and low mood the week before menstruation. States anxiety at that time is, "very sharp," with "restless racing thoughts". Also reports insomnia at that time.  Otherwise, anxiety is situational, "I become overwhelmed in some social settings, but I can typically manage that."  Sometimes increases buspirone to 7.5 mg daily when anxiety is increased.  Overall, anxiety does not affect functioning.    No interval episodes with symptoms consistent with jose or hypomania.  Denied interval or current suicidal/homicidal thoughts, intent, or plan or NSSI.  Denied other questions and concerns.    Medication side effects: None  Medication adherence: yes    PSYCHIATRIC REVIEW OF SYSTEMS:  Is patient experiencing or having changes in:  Trouble with sleep:  +initial insomnia, uses white noise, difficulty maintaining sleep but can return to sleep easily; some difficulty sleeping recently but sleeping 8-9 hours per night  Appetite changes:  decreased with Ozempic  Weight changes:  no  Lack of energy:  +chronic fatigue  Anhedonia:  no  Somatic symptoms:  no  Anxiety/panic:  well controlled, typically experiences increased anxiety with PMS  Irritability: no  Guilty/hopeless:  no  Concentration: difficulty at baseline- ADHD-IT  Racing thoughts: no  Impulsive behaviors: no  Paranoia/AVH: no  Self-injurious behavior/risky behavior:  no  Any drugs:  no  Alcohol:  no    MEDICAL REVIEW OF SYSTEMS:   Pain: Denies any significant chronic or acute pain.  Constitutional: Denies fever or change in appetite.  Cardiovascular: Denies chest pain or exertional dyspnea.  Respiratory: " "Denies cough or orthopnea.   GI: Denies abdominal pain, N/V  Neurological: Denies tremor, seizure, or focal weakness.  Psychiatric: See HPI above.    PAST PSYCHIATRIC, MEDICAL, AND SOCIAL HISTORY REVIEWED  The patient's past medical, family and social history have been reviewed and updated as appropriate within the electronic medical record - see encounter notes.    PAST MEDICAL HISTORY:   History reviewed. No pertinent past medical history. Head trauma/Loss of consciousness: denies  Seizures: denies     PAST PSYCHIATRIC HISTORY:  Psychiatric Care (current & past):  denies  Previous Psychiatric Diagnoses:  JALEN  Previous Psychiatric Hospitalizations: denies  Previous SI/HI:  denies  Previous Suicide Attempts or NSSI: denies  History of Psychotherapy: briefly for several months 5 years ago  History of Violence: denies       MEDICATIONS:    Current Outpatient Medications:     atomoxetine (STRATTERA) 10 MG capsule, Take 1 capsule (10 mg total) by mouth once daily., Disp: 30 capsule, Rfl: 0    busPIRone (BUSPAR) 5 MG Tab, TAKE 1 TABLET BY MOUTH DAILY AS NEEDED, Disp: 90 tablet, Rfl: 1    cyclobenzaprine (FLEXERIL) 10 MG tablet, Take 1 tablet (10 mg total) by mouth every evening., Disp: 30 tablet, Rfl: 2    OZEMPIC 1 mg/dose (4 mg/3 mL), Inject 1 mg into the skin once a week., Disp: , Rfl:     ALLERGIES:  Review of patient's allergies indicates:   Allergen Reactions    Amoxicillin Diarrhea    Cefaclor Hives    Augmentin [amoxicillin-pot clavulanate] Hives    Polysporin [bacitracin-polymyxin b] Other (See Comments)     Burning sensation       EXAM:  Constitutional  Vitals:  Most recent vital signs were reviewed.   Last 3 sets of VS:      10/13/2022    11:13 AM 10/3/2023     1:08 PM 10/5/2023     9:05 AM   Vitals - 1 value per visit   SYSTOLIC 126 114 106   DIASTOLIC 78 78 70   Pulse 86 101 108   SPO2 96 % 96 %    Weight (lb) 239.64 216.49 211.09   Weight (kg) 108.7 98.2 95.75   Height 5' 2" (1.575 m) 5' 2" (1.575 m) 5' 2" " (1.575 m)   BMI (Calculated) 43.8 39.6 38.6   Pain Score Two Zero Zero      General:  unremarkable, age appropriate     Musculoskeletal  Muscle Strength/Tone:  No tremors appreciated   Gait & Station:  Unable to assess, Pt seated during virtual visit     Psychiatric  Speech:  no latency; no press   Mood & Affect:  euthymic  congruent and appropriate   Thought Process:  normal and logical   Associations:  intact   Thought Content:  normal, no suicidality, no homicidality, delusions, or paranoia   Insight:  intact   Judgement: behavior is adequate to circumstances   Orientation:  grossly intact   Memory: intact for content of interview   Language: grossly intact   Attention Span & Concentration:  able to focus   Fund of Knowledge:  intact and appropriate to age and level of education     SUICIDE RISK ASSESSMENT:  Protective factors: age, gender, no prior attempts, no prior hospitalizations, no family h/o attempts, no ongoing substance abuse, no psychosis, , denies SI/intent/plan, seeking treatment, access to treatment, future oriented, good primary support  Risks:  Access to firearms, history of anxiety and ADHD-IT  Patient is a low immediate and long-term risk considering risk factors.        RELEVANT LABS/STUDIES:    Lab Results   Component Value Date    WBC 9.08 10/04/2023    HGB 13.5 10/04/2023    HCT 41.1 10/04/2023    MCV 80 (L) 10/04/2023     10/04/2023     BMP  Lab Results   Component Value Date     10/04/2023    K 3.9 10/04/2023     10/04/2023    CO2 25 10/04/2023    BUN 11 10/04/2023    CREATININE 0.8 10/04/2023    CALCIUM 9.6 10/04/2023    ANIONGAP 9 10/04/2023     Lab Results   Component Value Date    ALT 23 10/04/2023    AST 16 10/04/2023    ALKPHOS 108 10/04/2023    BILITOT 0.2 10/04/2023     Lab Results   Component Value Date    TSH 0.458 10/04/2023     Lab Results   Component Value Date    HGBA1C 5.1 10/04/2023     Lab Results   Component Value Date    CHOL 244 (H) 10/04/2023     TRIG 262 (H) 10/04/2023    HDL 34 (L) 10/04/2023    LDLCALC 157.6 10/04/2023    CHOLHDL 13.9 (L) 10/04/2023    TOTALCHOLEST 7.2 (H) 10/04/2023       IMPRESSION:    Carolina Yoder is a 33 y.o. female with history of unspecified anxiety disorder and ADHD-IT who presents for follow up appointment.    Status/Progress: Based on the examination today, the patient's problem(s) is/are inadequately controlled.  New problems have not been presented today.   Co-morbidities are complicating management of the primary condition.  There are no active rule-out diagnoses for this patient at this time.     Risk Parameters:  Patient reports no suicidal ideation  Patient reports no homicidal ideation  Patient reports no self-injurious behavior  Patient reports no violent behavior    DIAGNOSES:    ICD-10-CM ICD-9-CM   1. ADHD (attention deficit hyperactivity disorder), inattentive type  F90.0 314.00   2. Anxiety disorder, unspecified type  F41.9 300.00       PLAN:  START atomoxetine 10 mg daily for ADHD-IT  Continue buspirone 5 mg daily p.r.n. anxiety  Offered referral for individual psychotherapy    RETURN TO CLINIC:    4-6 weeks      MARCELINA Espinosa, PMHNP-BC      30 minutes of total time spent on the encounter, which includes face to face time and non-face to face time preparing to see the patient (eg. review of tests), obtaining and/or reviewing separately obtained history, documenting clinical information in the electronic health record, independently interpreting results (not separately reported), and communicating results to the patient/family/caregiver, or care coordination (not separately reported).     At this time there are no indications the patient represents an imminent danger to either themselves or others; will continue to manage treatment in the outpatient setting.    I discussed the patient's care with the patient including benefits, alternatives, possible adverse effects of the treatment plan; including the  "potential for metabolic complications, major organ dysfunction, black box warnings, and contraindications. The opportunity was given for questions/clarification, and after this discussion the above treatment plan was devised through shared decision making. The patient voiced their understanding of the diagnoses and treatments listed above and agreed to the treatment plan. Follow up plan was reviewed with the patient. The patient was advised to call to report any worsening of symptoms or problems with medication.    Supportive therapy and psychoeducation provided. Patient has been given crisis information including Suicide and Crisis Lifeline (call or text: 790). Patient also given instructions to go to the nearest ER or call 911 if unable to remain safe or if the Pt develops thoughts of harming self or others.    Documentation entered by me for this encounter may have been done in part using [employer] voice recognition transcription software. Garbled syntax, mangled pronouns, and other bizarre constructions may be attributed to that software system. Although I have made an effort to ensure accuracy, "sound like" errors may exist and should be interpreted in context.    "

## 2024-01-31 ENCOUNTER — PATIENT MESSAGE (OUTPATIENT)
Dept: PSYCHIATRY | Facility: CLINIC | Age: 34
End: 2024-01-31
Payer: COMMERCIAL

## 2024-02-05 ENCOUNTER — PATIENT MESSAGE (OUTPATIENT)
Dept: FAMILY MEDICINE | Facility: CLINIC | Age: 34
End: 2024-02-05
Payer: COMMERCIAL

## 2024-02-07 ENCOUNTER — HOSPITAL ENCOUNTER (EMERGENCY)
Age: 34
Discharge: HOME | End: 2024-02-08

## 2024-02-07 VITALS
WEIGHT: 144.07 LBS | OXYGEN SATURATION: 100 % | DIASTOLIC BLOOD PRESSURE: 68 MMHG | HEART RATE: 107 BPM | SYSTOLIC BLOOD PRESSURE: 101 MMHG | RESPIRATION RATE: 18 BRPM | BODY MASS INDEX: 26.51 KG/M2 | HEIGHT: 62 IN | TEMPERATURE: 97.7 F

## 2024-02-07 DIAGNOSIS — S20.212A CONTUSION OF RIB ON LEFT SIDE, INITIAL ENCOUNTER: Primary | ICD-10-CM

## 2024-02-07 PROCEDURE — 99283 EMERGENCY DEPT VISIT LOW MDM: CPT

## 2024-02-08 ENCOUNTER — LAB VISIT (OUTPATIENT)
Dept: LAB | Facility: HOSPITAL | Age: 34
End: 2024-02-08
Payer: COMMERCIAL

## 2024-02-08 ENCOUNTER — APPOINTMENT (OUTPATIENT)
Dept: GENERAL RADIOLOGY | Age: 34
End: 2024-02-08

## 2024-02-08 DIAGNOSIS — F90.0 ADHD (ATTENTION DEFICIT HYPERACTIVITY DISORDER), INATTENTIVE TYPE: ICD-10-CM

## 2024-02-08 PROCEDURE — 10002800 HB RX 250 W HCPCS: Performed by: PHYSICIAN ASSISTANT

## 2024-02-08 PROCEDURE — 71101 X-RAY EXAM UNILAT RIBS/CHEST: CPT

## 2024-02-08 PROCEDURE — 36415 COLL VENOUS BLD VENIPUNCTURE: CPT | Mod: PO

## 2024-02-08 PROCEDURE — 96372 THER/PROPH/DIAG INJ SC/IM: CPT | Performed by: PHYSICIAN ASSISTANT

## 2024-02-08 PROCEDURE — 10002803 HB RX 637: Performed by: PHYSICIAN ASSISTANT

## 2024-02-08 RX ORDER — HYDROCODONE BITARTRATE AND ACETAMINOPHEN 5; 325 MG/1; MG/1
1 TABLET ORAL EVERY 6 HOURS PRN
Qty: 10 TABLET | Refills: 0 | Status: SHIPPED | OUTPATIENT
Start: 2024-02-08 | End: 2024-02-18

## 2024-02-08 RX ORDER — IBUPROFEN 600 MG/1
600 TABLET ORAL EVERY 8 HOURS PRN
Qty: 15 TABLET | Refills: 0 | Status: SHIPPED | OUTPATIENT
Start: 2024-02-08 | End: 2024-02-13

## 2024-02-08 RX ORDER — KETOROLAC TROMETHAMINE 30 MG/ML
30 INJECTION, SOLUTION INTRAMUSCULAR; INTRAVENOUS ONCE
Status: COMPLETED | OUTPATIENT
Start: 2024-02-08 | End: 2024-02-08

## 2024-02-08 RX ORDER — HYDROCODONE BITARTRATE AND ACETAMINOPHEN 5; 325 MG/1; MG/1
1 TABLET ORAL ONCE
Status: COMPLETED | OUTPATIENT
Start: 2024-02-08 | End: 2024-02-08

## 2024-02-08 RX ORDER — ORPHENADRINE CITRATE 30 MG/ML
60 INJECTION INTRAMUSCULAR; INTRAVENOUS ONCE
Status: COMPLETED | OUTPATIENT
Start: 2024-02-08 | End: 2024-02-08

## 2024-02-08 RX ORDER — LIDOCAINE 4 G/G
1 PATCH TOPICAL ONCE
Status: DISCONTINUED | OUTPATIENT
Start: 2024-02-08 | End: 2024-02-08 | Stop reason: HOSPADM

## 2024-02-08 RX ADMIN — ORPHENADRINE CITRATE 60 MG: 60 INJECTION INTRAMUSCULAR; INTRAVENOUS at 00:48

## 2024-02-08 RX ADMIN — LIDOCAINE 1 PATCH: 4 PATCH TOPICAL at 03:22

## 2024-02-08 RX ADMIN — KETOROLAC TROMETHAMINE 30 MG: 30 INJECTION, SOLUTION INTRAMUSCULAR; INTRAVENOUS at 00:48

## 2024-02-08 RX ADMIN — HYDROCODONE BITARTRATE AND ACETAMINOPHEN 1 TABLET: 5; 325 TABLET ORAL at 03:22

## 2024-02-08 ASSESSMENT — PAIN SCALES - GENERAL
PAINLEVEL_OUTOF10: 7
PAINLEVEL_OUTOF10: 8

## 2024-02-14 ENCOUNTER — PATIENT MESSAGE (OUTPATIENT)
Dept: PSYCHIATRY | Facility: CLINIC | Age: 34
End: 2024-02-14
Payer: COMMERCIAL

## 2024-02-14 LAB
ONEOME COMMENT: NORMAL
ONEOME METHOD: NORMAL

## 2024-02-20 ENCOUNTER — OFFICE VISIT (OUTPATIENT)
Dept: FAMILY MEDICINE | Facility: CLINIC | Age: 34
End: 2024-02-20
Payer: COMMERCIAL

## 2024-02-20 DIAGNOSIS — F41.0 PANIC ATTACKS: ICD-10-CM

## 2024-02-20 DIAGNOSIS — F41.9 ANXIETY DISORDER, UNSPECIFIED TYPE: Primary | ICD-10-CM

## 2024-02-20 PROCEDURE — 1159F MED LIST DOCD IN RCRD: CPT | Mod: CPTII,95,, | Performed by: INTERNAL MEDICINE

## 2024-02-20 PROCEDURE — 1160F RVW MEDS BY RX/DR IN RCRD: CPT | Mod: CPTII,95,, | Performed by: INTERNAL MEDICINE

## 2024-02-20 PROCEDURE — 99213 OFFICE O/P EST LOW 20 MIN: CPT | Mod: 95,,, | Performed by: INTERNAL MEDICINE

## 2024-02-20 RX ORDER — BUSPIRONE HYDROCHLORIDE 7.5 MG/1
7.5 TABLET ORAL 3 TIMES DAILY
Qty: 90 TABLET | Refills: 11 | Status: SHIPPED | OUTPATIENT
Start: 2024-02-20 | End: 2025-02-19

## 2024-02-20 NOTE — PROGRESS NOTES
Subjective     Carolina Yoder is a 33 y.o. old, female here for f/u    Telemedicine visit  Patient's Location: home  Visit Type: Virtual Visit with synchronous audio and video  Each patient to whom he or she provides medical services by telemedicine is:  (1) informed of the relationship between the physician and patient and the respective role of any other health care provider with respect to management of the patient; and (2) notified that he or she may decline to receive medical services by telemedicine and may withdraw from such care at any time.    34 y/o with PMH of PCOS, anxiety, panic d/o     Patient is here requesting a dose increase of buspirone. She takes 2-3 times daily and notices good relief of symptoms. 10 mg dose seems like too much, overly sedating, 5 mg - too little, reasonable to take 7.5 mg 2-3 times daily.  New job and extra cognitive demands at work seem to be the most recent exacerbating factor.  Answers submitted by the patient for this visit:  Review of Systems Questionnaire (Submitted on 2/20/2024)  activity change: No  unexpected weight change: No  rhinorrhea: No  trouble swallowing: No  visual disturbance: No  chest tightness: No  polyuria: No  difficulty urinating: No  menstrual problem: No  arthralgias: No  confusion: No  dysphoric mood: No    Review of Systems   HENT:  Negative for hearing loss.    Eyes:  Negative for discharge.   Respiratory:  Negative for wheezing.    Cardiovascular:  Negative for chest pain and palpitations.   Gastrointestinal:  Negative for blood in stool, constipation, diarrhea and vomiting.   Genitourinary:  Negative for dysuria and hematuria.   Musculoskeletal:  Negative for neck pain.   Neurological:  Negative for weakness and headaches.   Endo/Heme/Allergies:  Negative for polydipsia.     Medications     No outpatient medications have been marked as taking for the 2/20/24 encounter (Office Visit) with Gallito Bustamante MD.     Objective   Physical  Exam  Constitutional:       General: She is not in acute distress.     Appearance: Normal appearance. She is well-developed.   Neurological:      Mental Status: She is alert.       Assessment and Plan     Anxiety disorder, unspecified type  -     busPIRone (BUSPAR) 7.5 MG tablet; Take 1 tablet (7.5 mg total) by mouth 3 (three) times daily.  Dispense: 90 tablet; Refill: 11    Panic attacks          ___________________  Gallito Bustamante MD  Internal Medicine and Pediatrics

## 2024-05-28 ENCOUNTER — OFFICE VISIT (OUTPATIENT)
Dept: FAMILY MEDICINE | Facility: CLINIC | Age: 34
End: 2024-05-28
Payer: COMMERCIAL

## 2024-05-28 DIAGNOSIS — F41.9 ANXIETY DISORDER, UNSPECIFIED TYPE: Primary | ICD-10-CM

## 2024-05-28 PROCEDURE — 1160F RVW MEDS BY RX/DR IN RCRD: CPT | Mod: CPTII,95,, | Performed by: INTERNAL MEDICINE

## 2024-05-28 PROCEDURE — 1159F MED LIST DOCD IN RCRD: CPT | Mod: CPTII,95,, | Performed by: INTERNAL MEDICINE

## 2024-05-28 PROCEDURE — 99213 OFFICE O/P EST LOW 20 MIN: CPT | Mod: 95,,, | Performed by: INTERNAL MEDICINE

## 2024-05-28 RX ORDER — FLUOXETINE HYDROCHLORIDE 20 MG/1
20 CAPSULE ORAL DAILY
Qty: 30 CAPSULE | Refills: 2 | Status: SHIPPED | OUTPATIENT
Start: 2024-05-28 | End: 2025-05-28

## 2024-05-28 NOTE — PROGRESS NOTES
Subjective     Carolina Yoder is a 34 y.o. old, female here for f/u on anxiety    Telemedicine visit  Patient's Location: home  Visit Type: Virtual Visit with synchronous audio and video  Each patient to whom he or she provides medical services by telemedicine is:  (1) informed of the relationship between the physician and patient and the respective role of any other health care provider with respect to management of the patient; and (2) notified that he or she may decline to receive medical services by telemedicine and may withdraw from such care at any time.    34 y/o with PMH of PCOS, anxiety, panic d/o  Overall noticing more generalized and daily anxiety symptom rather than predominant panic symptoms recently. She continue to take buspirone 7.5 mg and higher doses have been tried but she gets dizziness with the higher doses.  FH of JALEN in her mother, who takes fluoxetine.  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  Answers submitted by the patient for this visit:  Review of Systems Questionnaire (Submitted on 5/25/2024)  activity change: No  unexpected weight change: No  rhinorrhea: No  trouble swallowing: No  visual disturbance: No  chest tightness: No  polyuria: No  difficulty urinating: No  menstrual problem: No  joint swelling: No  arthralgias: No  confusion: No  dysphoric mood: Yes    Review of Systems   HENT:  Negative for hearing loss.    Eyes:  Negative for discharge.   Respiratory:  Negative for wheezing.    Cardiovascular:  Negative for chest pain and palpitations.   Gastrointestinal:  Negative for blood in stool, constipation, diarrhea and vomiting.   Genitourinary:  Negative for dysuria and hematuria.   Musculoskeletal:  Negative for neck pain.   Neurological:  Negative for weakness and headaches.   Endo/Heme/Allergies:  Negative for polydipsia.     Medications     No outpatient medications have been marked as  taking for the 5/28/24 encounter (Office Visit) with Gallito Bustamante MD.     Objective   Physical Exam  Constitutional:       General: She is not in acute distress.     Appearance: Normal appearance. She is well-developed.   Neurological:      Mental Status: She is alert.       Assessment and Plan     Anxiety disorder, unspecified type  -     FLUoxetine 20 MG capsule; Take 1 capsule (20 mg total) by mouth once daily.  Dispense: 30 capsule; Refill: 2      Reasonable to start first line SSRI therapy and f/u in 1-2 months.    ___________________  Gallito Bustamante MD  Internal Medicine and Pediatrics

## 2024-06-20 ENCOUNTER — PATIENT OUTREACH (OUTPATIENT)
Dept: ADMINISTRATIVE | Facility: HOSPITAL | Age: 34
End: 2024-06-20
Payer: COMMERCIAL

## 2024-06-20 NOTE — PROGRESS NOTES
Population Health Chart Review & Patient Outreach Details      Additional Pop Health Notes:               Updates Requested / Reviewed:      Updated Care Coordination Note, Care Everywhere, and External Sources: [facility] and [facility]         Health Maintenance Topics Overdue:      VBHM Score: 0     Patient is not due for any topics at this time.                       Health Maintenance Topic(s) Outreach Outcomes & Actions Taken:    Cervical Cancer Screening - Outreach Outcomes & Actions Taken  : External Records Uploaded & Care Team Updated if Applicable

## 2024-06-27 DIAGNOSIS — M54.59 MECHANICAL LOW BACK PAIN: ICD-10-CM

## 2024-06-27 RX ORDER — CYCLOBENZAPRINE HCL 10 MG
10 TABLET ORAL NIGHTLY
Qty: 30 TABLET | Refills: 2 | Status: SHIPPED | OUTPATIENT
Start: 2024-06-27

## 2024-06-27 NOTE — TELEPHONE ENCOUNTER
No care due was identified.  [facility] Embedded Care Due Messages. Reference number: [id number].   6/27/2024 12:37:06 PM CDT

## 2024-07-09 ENCOUNTER — OFFICE VISIT (OUTPATIENT)
Dept: FAMILY MEDICINE | Facility: CLINIC | Age: 34
End: 2024-07-09
Payer: COMMERCIAL

## 2024-07-09 DIAGNOSIS — F41.9 ANXIETY DISORDER, UNSPECIFIED TYPE: ICD-10-CM

## 2024-07-09 PROCEDURE — 99213 OFFICE O/P EST LOW 20 MIN: CPT | Mod: 95,,, | Performed by: INTERNAL MEDICINE

## 2024-07-09 RX ORDER — FLUOXETINE 10 MG/1
10 CAPSULE ORAL DAILY
Qty: 30 CAPSULE | Refills: 11 | Status: SHIPPED | OUTPATIENT
Start: 2024-07-09 | End: 2025-07-09

## 2024-07-09 RX ORDER — FLUOXETINE HYDROCHLORIDE 20 MG/1
20 CAPSULE ORAL DAILY
Qty: 30 CAPSULE | Refills: 11 | Status: SHIPPED | OUTPATIENT
Start: 2024-07-09 | End: 2025-07-09

## 2024-07-09 NOTE — PROGRESS NOTES
Subjective     Carolina Yoder is a 34 y.o. old, female here for f/u    Telemedicine visit  Patient's Location: home  Visit Type: Virtual Visit with synchronous audio and video  Each patient to whom he or she provides medical services by telemedicine is:  (1) informed of the relationship between the physician and patient and the respective role of any other health care provider with respect to management of the patient; and (2) notified that he or she may decline to receive medical services by telemedicine and may withdraw from such care at any time.    35 y/o with PMH of PCOS, anxiety, panic d/o     Here after starting on prozac at last visit. It has worked out remarkably well.  Improved intrusive thoughts, perseverating on certain things and improved anxiety generally.  She has had some bizarre dreams possibly as a SE.   She is interested in increasing the dose to 30 mg, which is reasonable.  Answers submitted by the patient for this visit:  Review of Systems Questionnaire (Submitted on 7/2/2024)  activity change: No  unexpected weight change: No  rhinorrhea: No  trouble swallowing: No  visual disturbance: No  chest tightness: No  polyuria: No  difficulty urinating: No  menstrual problem: No  joint swelling: No  arthralgias: No  confusion: No  dysphoric mood: No    Review of Systems   HENT:  Negative for hearing loss.    Eyes:  Negative for discharge.   Respiratory:  Negative for wheezing.    Cardiovascular:  Negative for chest pain and palpitations.   Gastrointestinal:  Negative for blood in stool, constipation, diarrhea and vomiting.   Genitourinary:  Negative for dysuria and hematuria.   Musculoskeletal:  Negative for neck pain.   Neurological:  Negative for weakness and headaches.   Endo/Heme/Allergies:  Negative for polydipsia.     Medications     No outpatient medications have been marked as taking for the 7/9/24 encounter (Office Visit) with Gallito Bustamante MD.     Objective   Physical  Exam  Constitutional:       General: She is not in acute distress.     Appearance: Normal appearance. She is well-developed.   Neurological:      Mental Status: She is alert.       Assessment and Plan     Anxiety disorder, unspecified type  -     FLUoxetine 10 MG capsule; Take 1 capsule (10 mg total) by mouth once daily. Take together with 20 mg fluoxetine every day.  Dispense: 30 capsule; Refill: 11  -     FLUoxetine 20 MG capsule; Take 1 capsule (20 mg total) by mouth once daily.  Dispense: 30 capsule; Refill: 11        ___________________  Gallito Bustamante MD  Internal Medicine and Pediatrics

## 2025-03-04 DIAGNOSIS — F41.9 ANXIETY DISORDER, UNSPECIFIED TYPE: ICD-10-CM

## 2025-03-04 RX ORDER — BUSPIRONE HYDROCHLORIDE 15 MG/1
7.5 TABLET ORAL 3 TIMES DAILY
Qty: 90 TABLET | Refills: 11 | Status: SHIPPED | OUTPATIENT
Start: 2025-03-04

## 2025-03-04 NOTE — TELEPHONE ENCOUNTER
No care due was identified.  [facility] Embedded Care Due Messages. Reference number: [id number].   3/04/2025 1:21:05 PM CST

## 2025-03-21 DIAGNOSIS — M54.59 MECHANICAL LOW BACK PAIN: ICD-10-CM

## 2025-03-21 RX ORDER — CYCLOBENZAPRINE HCL 10 MG
10 TABLET ORAL NIGHTLY
Qty: 30 TABLET | Refills: 2 | Status: SHIPPED | OUTPATIENT
Start: 2025-03-21

## 2025-03-21 NOTE — TELEPHONE ENCOUNTER
No care due was identified.  [facility] Embedded Care Due Messages. Reference number: [id number].   3/21/2025 10:16:29 AM CDT

## 2025-06-16 DIAGNOSIS — F41.9 ANXIETY DISORDER, UNSPECIFIED TYPE: ICD-10-CM

## 2025-06-16 RX ORDER — FLUOXETINE 10 MG/1
10 CAPSULE ORAL DAILY
Qty: 30 CAPSULE | Refills: 11 | Status: SHIPPED | OUTPATIENT
Start: 2025-06-16 | End: 2026-06-16

## 2025-06-16 NOTE — TELEPHONE ENCOUNTER
Care Due:                  Date            Visit Type   Department     Provider  --------------------------------------------------------------------------------                                ESTABLISHED                              PATIENT -    [facility]  Last Visit: 07-      [facility]      MEDICINE       Gallito Bustamante  Next Visit: None Scheduled  None         None Found                                                            Last  Test          Frequency    Reason                     Performed    Due Date  --------------------------------------------------------------------------------    Office Visit  12 months..  busPIRone................  07- 07-    [facility] Embedded Care Due Messages. Reference number: [id number].   6/16/2025 9:18:11 AM CDT

## 2025-07-06 DIAGNOSIS — F41.9 ANXIETY DISORDER, UNSPECIFIED TYPE: ICD-10-CM

## 2025-07-06 NOTE — TELEPHONE ENCOUNTER
No care due was identified.  [facility] Embedded Care Due Messages. Reference number: [id number].   7/06/2025 3:42:56 AM CDT

## 2025-07-07 RX ORDER — FLUOXETINE 10 MG/1
CAPSULE ORAL
Qty: 90 CAPSULE | Refills: 0 | Status: SHIPPED | OUTPATIENT
Start: 2025-07-07

## 2025-07-07 NOTE — TELEPHONE ENCOUNTER
Refill Decision Note   Carolina Paresh  is requesting a refill authorization.  Brief Assessment and Rationale for Refill:  Approve     Medication Therapy Plan:         Alert overridden per protocol: Yes   Comments:     Note composed:12:34 PM 07/07/2025

## 2025-07-18 DIAGNOSIS — F41.9 ANXIETY DISORDER, UNSPECIFIED TYPE: ICD-10-CM

## 2025-07-18 RX ORDER — FLUOXETINE 20 MG/1
20 CAPSULE ORAL DAILY
Qty: 30 CAPSULE | Refills: 0 | Status: SHIPPED | OUTPATIENT
Start: 2025-07-18 | End: 2026-07-18

## 2025-07-18 RX ORDER — FLUOXETINE 20 MG/1
CAPSULE ORAL
Qty: 30 CAPSULE | Refills: 11 | OUTPATIENT
Start: 2025-07-18

## 2025-07-18 NOTE — TELEPHONE ENCOUNTER
Refill Decision Note   Carolina Yoder  is requesting a refill authorization.  Brief Assessment and Rationale for Refill:  Quick Discontinue     Medication Therapy Plan:        Comments:     Note composed:5:10 PM 07/18/2025

## 2025-07-18 NOTE — TELEPHONE ENCOUNTER
No care due was identified.  [facility] Embedded Care Due Messages. Reference number: [id number].   7/18/2025 1:13:46 PM CDT

## 2025-07-18 NOTE — TELEPHONE ENCOUNTER
No care due was identified.  [facility] Embedded Care Due Messages. Reference number: [id number].   7/18/2025 1:15:47 PM CDT

## 2025-08-17 DIAGNOSIS — F41.9 ANXIETY DISORDER, UNSPECIFIED TYPE: ICD-10-CM

## 2025-08-17 RX ORDER — FLUOXETINE 20 MG/1
20 CAPSULE ORAL DAILY
Qty: 90 CAPSULE | Refills: 0 | Status: SHIPPED | OUTPATIENT
Start: 2025-08-17

## 2025-08-19 ENCOUNTER — PATIENT MESSAGE (OUTPATIENT)
Dept: ADMINISTRATIVE | Facility: HOSPITAL | Age: 35
End: 2025-08-19
Payer: COMMERCIAL